# Patient Record
Sex: FEMALE | Race: OTHER | HISPANIC OR LATINO | Employment: UNEMPLOYED | URBAN - METROPOLITAN AREA
[De-identification: names, ages, dates, MRNs, and addresses within clinical notes are randomized per-mention and may not be internally consistent; named-entity substitution may affect disease eponyms.]

---

## 2021-06-08 ENCOUNTER — HOSPITAL ENCOUNTER (EMERGENCY)
Facility: HOSPITAL | Age: 36
Discharge: HOME | End: 2021-06-08
Attending: EMERGENCY MEDICINE | Admitting: EMERGENCY MEDICINE
Payer: COMMERCIAL

## 2021-06-08 ENCOUNTER — APPOINTMENT (EMERGENCY)
Dept: CT IMAGING | Facility: HOSPITAL | Age: 36
End: 2021-06-08
Payer: COMMERCIAL

## 2021-06-08 VITALS
WEIGHT: 209.44 LBS | DIASTOLIC BLOOD PRESSURE: 81 MMHG | SYSTOLIC BLOOD PRESSURE: 124 MMHG | HEART RATE: 88 BPM | OXYGEN SATURATION: 98 % | RESPIRATION RATE: 16 BRPM | TEMPERATURE: 98.8 F

## 2021-06-08 DIAGNOSIS — S30.1XXA ABDOMINAL WALL SEROMA, INITIAL ENCOUNTER: Primary | ICD-10-CM

## 2021-06-08 LAB
ALBUMIN SERPL BCP-MCNC: 4 G/DL (ref 3.5–5)
ALP SERPL-CCNC: 53 U/L (ref 46–116)
ALT SERPL W P-5'-P-CCNC: 51 U/L (ref 12–78)
ANION GAP SERPL CALCULATED.3IONS-SCNC: 11 MMOL/L (ref 4–13)
AST SERPL W P-5'-P-CCNC: 20 U/L (ref 5–45)
BACTERIA UR QL AUTO: ABNORMAL /HPF
BASOPHILS # BLD AUTO: 0.03 THOUSANDS/ΜL (ref 0–0.1)
BASOPHILS NFR BLD AUTO: 0 % (ref 0–1)
BILIRUB SERPL-MCNC: 0.31 MG/DL (ref 0.2–1)
BILIRUB UR QL STRIP: NEGATIVE
BUN SERPL-MCNC: 12 MG/DL (ref 5–25)
CALCIUM SERPL-MCNC: 9.4 MG/DL (ref 8.3–10.1)
CHLORIDE SERPL-SCNC: 106 MMOL/L (ref 100–108)
CLARITY UR: CLEAR
CO2 SERPL-SCNC: 23 MMOL/L (ref 21–32)
COLOR UR: YELLOW
CREAT SERPL-MCNC: 0.58 MG/DL (ref 0.6–1.3)
EOSINOPHIL # BLD AUTO: 0.08 THOUSAND/ΜL (ref 0–0.61)
EOSINOPHIL NFR BLD AUTO: 1 % (ref 0–6)
ERYTHROCYTE [DISTWIDTH] IN BLOOD BY AUTOMATED COUNT: 13.3 % (ref 11.6–15.1)
EXT PREG TEST URINE: NEGATIVE
EXT. CONTROL ED NAV: NORMAL
GFR SERPL CREATININE-BSD FRML MDRD: 121 ML/MIN/1.73SQ M
GLUCOSE SERPL-MCNC: 90 MG/DL (ref 65–140)
GLUCOSE UR STRIP-MCNC: NEGATIVE MG/DL
HCT VFR BLD AUTO: 40.7 % (ref 34.8–46.1)
HGB BLD-MCNC: 13.2 G/DL (ref 11.5–15.4)
HGB UR QL STRIP.AUTO: ABNORMAL
IMM GRANULOCYTES # BLD AUTO: 0.03 THOUSAND/UL (ref 0–0.2)
IMM GRANULOCYTES NFR BLD AUTO: 0 % (ref 0–2)
KETONES UR STRIP-MCNC: NEGATIVE MG/DL
LEUKOCYTE ESTERASE UR QL STRIP: NEGATIVE
LYMPHOCYTES # BLD AUTO: 2.69 THOUSANDS/ΜL (ref 0.6–4.47)
LYMPHOCYTES NFR BLD AUTO: 36 % (ref 14–44)
MCH RBC QN AUTO: 28.1 PG (ref 26.8–34.3)
MCHC RBC AUTO-ENTMCNC: 32.4 G/DL (ref 31.4–37.4)
MCV RBC AUTO: 87 FL (ref 82–98)
MONOCYTES # BLD AUTO: 0.43 THOUSAND/ΜL (ref 0.17–1.22)
MONOCYTES NFR BLD AUTO: 6 % (ref 4–12)
NEUTROPHILS # BLD AUTO: 4.28 THOUSANDS/ΜL (ref 1.85–7.62)
NEUTS SEG NFR BLD AUTO: 57 % (ref 43–75)
NITRITE UR QL STRIP: NEGATIVE
NON-SQ EPI CELLS URNS QL MICRO: ABNORMAL /HPF
NRBC BLD AUTO-RTO: 0 /100 WBCS
PH UR STRIP.AUTO: 6 [PH] (ref 4.5–8)
PLATELET # BLD AUTO: 253 THOUSANDS/UL (ref 149–390)
PMV BLD AUTO: 11.1 FL (ref 8.9–12.7)
POTASSIUM SERPL-SCNC: 3.9 MMOL/L (ref 3.5–5.3)
PROT SERPL-MCNC: 7.8 G/DL (ref 6.4–8.2)
PROT UR STRIP-MCNC: NEGATIVE MG/DL
RBC # BLD AUTO: 4.69 MILLION/UL (ref 3.81–5.12)
RBC #/AREA URNS AUTO: ABNORMAL /HPF
SODIUM SERPL-SCNC: 140 MMOL/L (ref 136–145)
SP GR UR STRIP.AUTO: >=1.03 (ref 1–1.03)
UROBILINOGEN UR QL STRIP.AUTO: 0.2 E.U./DL
WBC # BLD AUTO: 7.54 THOUSAND/UL (ref 4.31–10.16)
WBC #/AREA URNS AUTO: ABNORMAL /HPF

## 2021-06-08 PROCEDURE — 81001 URINALYSIS AUTO W/SCOPE: CPT

## 2021-06-08 PROCEDURE — NC001 PR NO CHARGE: Performed by: SURGERY

## 2021-06-08 PROCEDURE — 99284 EMERGENCY DEPT VISIT MOD MDM: CPT | Performed by: EMERGENCY MEDICINE

## 2021-06-08 PROCEDURE — 81025 URINE PREGNANCY TEST: CPT | Performed by: EMERGENCY MEDICINE

## 2021-06-08 PROCEDURE — 74177 CT ABD & PELVIS W/CONTRAST: CPT

## 2021-06-08 PROCEDURE — 36415 COLL VENOUS BLD VENIPUNCTURE: CPT | Performed by: EMERGENCY MEDICINE

## 2021-06-08 PROCEDURE — 80053 COMPREHEN METABOLIC PANEL: CPT | Performed by: EMERGENCY MEDICINE

## 2021-06-08 PROCEDURE — 85025 COMPLETE CBC W/AUTO DIFF WBC: CPT | Performed by: EMERGENCY MEDICINE

## 2021-06-08 PROCEDURE — 96374 THER/PROPH/DIAG INJ IV PUSH: CPT

## 2021-06-08 PROCEDURE — 99284 EMERGENCY DEPT VISIT MOD MDM: CPT

## 2021-06-08 RX ORDER — NAPROXEN 500 MG/1
500 TABLET ORAL 2 TIMES DAILY PRN
Qty: 14 TABLET | Refills: 0 | Status: SHIPPED | OUTPATIENT
Start: 2021-06-08 | End: 2022-02-09 | Stop reason: ALTCHOICE

## 2021-06-08 RX ORDER — KETOROLAC TROMETHAMINE 30 MG/ML
15 INJECTION, SOLUTION INTRAMUSCULAR; INTRAVENOUS ONCE
Status: COMPLETED | OUTPATIENT
Start: 2021-06-08 | End: 2021-06-08

## 2021-06-08 RX ADMIN — IOHEXOL 100 ML: 350 INJECTION, SOLUTION INTRAVENOUS at 12:04

## 2021-06-08 RX ADMIN — KETOROLAC TROMETHAMINE 15 MG: 30 INJECTION, SOLUTION INTRAMUSCULAR; INTRAVENOUS at 11:04

## 2021-06-08 NOTE — DISCHARGE INSTRUCTIONS
The seroma fluid collection does not appear infected, but it can be uncomfortable  It typically absorbs on its own and it can sometimes drain from the skin  You should try to follow with a local plastic surgeon  I entered a referral   Return to the ED if you are experiencing increasing swelling, redness, large open wound, thick drainage, fever, or severe pain

## 2021-06-08 NOTE — ED PROVIDER NOTES
History  Chief Complaint   Patient presents with    Abdominal Pain     lower abd pain that started three days ago  denies n/v/d      30 yo female with HTN, hypothyroid, c/o abdominal pain she localizes to lower mid abdomen, suprapubic area, described as cramping, without associated fever, chills, nausea, vomiting  She denies urinary symptoms  She reports normal bowel movements  LMP is irregular, so she is not certain  She is S/P elective "tummy tuck" procedure she underwent in [facility] 2 months ago  [place]; she has not experienced any complications  She has been well in the interval until this onset of discomfort, that she is not attributing to the surgery  History provided by:  Patient  Abdominal Pain  Pain location:  Suprapubic  Pain quality: cramping    Pain radiates to:  Does not radiate  Onset quality:  Gradual  Duration:  3 days  Timing:  Intermittent  Progression:  Waxing and waning  Chronicity:  New  Context: previous surgery (S/P "Tummy Tuck" in [facility] two months ago)    Associated symptoms: no chest pain, no chills, no cough, no diarrhea, no dysuria, no fever, no hematuria, no nausea, no shortness of breath, no sore throat and no vomiting        None       Past Medical History:   Diagnosis Date    Disease of thyroid gland     Hypertension        Past Surgical History:   Procedure Laterality Date    COSMETIC SURGERY         History reviewed  No pertinent family history  I have reviewed and agree with the history as documented  E-Cigarette/Vaping    E-Cigarette Use Never User      E-Cigarette/Vaping Substances     Social History     Tobacco Use    Smoking status: Never Smoker    Smokeless tobacco: Never Used   Substance Use Topics    Alcohol use: Never     Frequency: Never    Drug use: Never       Review of Systems   Constitutional: Negative for appetite change, chills and fever  HENT: Negative for sore throat      Respiratory: Negative for cough, shortness of breath and wheezing  Cardiovascular: Negative for chest pain and palpitations  Gastrointestinal: Positive for abdominal pain  Negative for diarrhea, nausea and vomiting  Genitourinary: Negative for dysuria and hematuria  Musculoskeletal: Negative for neck pain  Skin: Negative for rash  Neurological: Negative for dizziness, weakness and headaches  Psychiatric/Behavioral: Negative for suicidal ideas  All other systems reviewed and are negative  Physical Exam  Physical Exam  Vitals signs and nursing note reviewed  Constitutional:       Appearance: She is well-developed  She is not toxic-appearing or diaphoretic  HENT:      Head: Normocephalic and atraumatic  Right Ear: Tympanic membrane and external ear normal       Left Ear: Tympanic membrane and external ear normal       Nose: Nose normal    Eyes:      Conjunctiva/sclera: Conjunctivae normal       Pupils: Pupils are equal, round, and reactive to light  Neck:      Musculoskeletal: Full passive range of motion without pain, normal range of motion and neck supple  Meningeal: Brudzinski's sign and Kernig's sign absent  Cardiovascular:      Rate and Rhythm: Normal rate and regular rhythm  Pulses: Normal pulses  Heart sounds: Normal heart sounds  No murmur  Pulmonary:      Effort: Pulmonary effort is normal  No tachypnea or respiratory distress  Breath sounds: Normal breath sounds  No wheezing  Abdominal:      General: Bowel sounds are normal  There is no distension  Palpations: Abdomen is soft  Abdomen is not rigid  Tenderness: There is abdominal tenderness (corresponds to the area overlying the surgical scar in the middle and also just inferior to it, although no flucturance, nor mass)  There is no guarding or rebound  Musculoskeletal: Normal range of motion  Right lower leg: She exhibits no swelling  Left lower leg: She exhibits no swelling     Lymphadenopathy: Cervical: No cervical adenopathy  Skin:     General: Skin is warm and dry  Coloration: Skin is not pale  Findings: No rash  Neurological:      Mental Status: She is alert and oriented to person, place, and time  GCS: GCS eye subscore is 4  GCS verbal subscore is 5  GCS motor subscore is 6  Cranial Nerves: No cranial nerve deficit  Sensory: No sensory deficit  Coordination: Coordination normal       Gait: Gait normal       Deep Tendon Reflexes: Reflexes are normal and symmetric  Psychiatric:         Speech: Speech normal          Behavior: Behavior normal          Thought Content:  Thought content normal          Judgment: Judgment normal          Vital Signs  ED Triage Vitals [06/08/21 1016]   Temperature Pulse Respirations Blood Pressure SpO2   98 8 °F (37 1 °C) 93 14 150/93 98 %      Temp Source Heart Rate Source Patient Position - Orthostatic VS BP Location FiO2 (%)   Oral Monitor Sitting Right arm --      Pain Score       8           Vitals:    06/08/21 1016 06/08/21 1304   BP: 150/93 124/81   Pulse: 93 88   Patient Position - Orthostatic VS: Sitting Lying         Visual Acuity      ED Medications  Medications   ketorolac (TORADOL) injection 15 mg (15 mg Intravenous Given 6/8/21 1104)   iohexol (OMNIPAQUE) 350 MG/ML injection (SINGLE-DOSE) 100 mL (100 mL Intravenous Given 6/8/21 1204)       Diagnostic Studies  Results Reviewed     Procedure Component Value Units Date/Time    Urine Microscopic [[id number]]  (Abnormal) Collected: 06/08/21 1116    Lab Status: Final result Specimen: Urine, Other Updated: 06/08/21 1202     RBC, UA 0-1 /hpf      WBC, UA 0-1 /hpf      Epithelial Cells Occasional /hpf      Bacteria, UA Occasional /hpf     Comprehensive metabolic panel [[id number]]  (Abnormal) Collected: 06/08/21 1103    Lab Status: Final result Specimen: Blood from Arm, Right Updated: 06/08/21 1127     Sodium 140 mmol/L      Potassium 3 9 mmol/L      Chloride 106 mmol/L      CO2 23 mmol/L      ANION GAP 11 mmol/L      BUN 12 mg/dL      Creatinine 0 58 mg/dL      Glucose 90 mg/dL      Calcium 9 4 mg/dL      AST 20 U/L      ALT 51 U/L      Alkaline Phosphatase 53 U/L      Total Protein 7 8 g/dL      Albumin 4 0 g/dL      Total Bilirubin 0 31 mg/dL      eGFR 121 ml/min/1 73sq m     Narrative:      National Kidney Disease Foundation guidelines for Chronic Kidney Disease (CKD):     Stage 1 with normal or high GFR (GFR > 90 mL/min/1 73 square meters)    Stage 2 Mild CKD (GFR = 60-89 mL/min/1 73 square meters)    Stage 3A Moderate CKD (GFR = 45-59 mL/min/1 73 square meters)    Stage 3B Moderate CKD (GFR = 30-44 mL/min/1 73 square meters)    Stage 4 Severe CKD (GFR = 15-29 mL/min/1 73 square meters)    Stage 5 End Stage CKD (GFR <15 mL/min/1 73 square meters)  Note: GFR calculation is accurate only with a steady state creatinine    Urine Macroscopic, POC [[id number]]  (Abnormal) Collected: 06/08/21 1116    Lab Status: Final result Specimen: Urine Updated: 06/08/21 1119     Color, UA Yellow     Clarity, UA Clear     pH, UA 6 0     Leukocytes, UA Negative     Nitrite, UA Negative     Protein, UA Negative mg/dl      Glucose, UA Negative mg/dl      Ketones, UA Negative mg/dl      Urobilinogen, UA 0 2 E U /dl      Bilirubin, UA Negative     Blood, UA Trace     Specific Gravity, UA >=1 030    Narrative:      CLINITEK RESULT    CBC and differential [[id number]] Collected: 06/08/21 1103    Lab Status: Final result Specimen: Blood from Arm, Right Updated: 06/08/21 1111     WBC 7 54 Thousand/uL      RBC 4 69 Million/uL      Hemoglobin 13 2 g/dL      Hematocrit 40 7 %      MCV 87 fL      MCH 28 1 pg      MCHC 32 4 g/dL      RDW 13 3 %      MPV 11 1 fL      Platelets 900 Thousands/uL      nRBC 0 /100 WBCs      Neutrophils Relative 57 %      Immat GRANS % 0 %      Lymphocytes Relative 36 %      Monocytes Relative 6 %      Eosinophils Relative 1 %      Basophils Relative 0 %      Neutrophils Absolute 4 28 Thousands/µL      Immature Grans Absolute 0 03 Thousand/uL      Lymphocytes Absolute 2 69 Thousands/µL      Monocytes Absolute 0 43 Thousand/µL      Eosinophils Absolute 0 08 Thousand/µL      Basophils Absolute 0 03 Thousands/µL     POCT pregnancy, urine [[id number]]  (Normal) Resulted: 06/08/21 1103    Lab Status: Final result Updated: 06/08/21 1106     EXT PREG TEST UR (Ref: Negative) negative     Control valid                 CT abdomen pelvis with contrast   Final Result by Charles Zhao MD (06/08 1214)      The patient is status post anterior abdominal wall surgery consistent with the given history  Thin sheet of fluid within the subcutaneous fat of the left infraumbilical abdominal wall measuring 0 5 cm thick, a 10 cm wide and 6 cm long  A postoperative    seroma is likely  Infection unable to be excluded  Clinical correlation is advised  Additional follow-up as clinically necessary  The study was marked in [facility] for immediate notification  Workstation performed: [id number]                    Procedures  Procedures         ED Course  ED Course as of Jun 08 1733   Tue Jun 08, 2021   1330 Results reviewed, subincisional fluid, likely source of pain, consult to general surgery entered   CT abdomen pelvis with contrast   1419 Reviewed results with patient at bedside and updated on the plan with surgery resident  No intervention required  Likely seroma, not currently infected  She is instructed to continue post op care, and to consider followup with local plastic surgery, and given return precautions  SBIRT 20yo+      Most Recent Value   SBIRT (22 yo +)   In order to provide better care to our patients, we are screening all of our patients for alcohol and drug use  Would it be okay to ask you these screening questions? Yes Filed at: 06/08/2021 1117   Initial Alcohol Screen: US AUDIT-C    1  How often do you have a drink containing alcohol?   1 Filed at: 06/08/2021 1117   3a  Male UNDER 65: How often do you have five or more drinks on one occasion? 0 Filed at: 06/08/2021 1117   3b  FEMALE Any Age, or MALE 65+: How often do you have 4 or more drinks on one occassion? 0 Filed at: 06/08/2021 1117   Audit-C Score  1 Filed at: 06/08/2021 1117   PJ: How many times in the past year have you    Used an illegal drug or used a prescription medication for non-medical reasons? Never Filed at: 06/08/2021 1117                    MDM    Disposition  Final diagnoses:   Abdominal wall seroma, initial encounter - postoperative     Time reflects when diagnosis was documented in both MDM as applicable and the Disposition within this note     Time User Action Codes Description Comment    6/8/2021  1:59 PM Valente Duverney L Add [S30 1XXA] Abdominal wall seroma, initial encounter     6/8/2021  1:59 PM Michelle Santana Modify [S30 1XXA] Abdominal wall seroma, initial encounter postoperative      ED Disposition     ED Disposition Condition Date/Time Comment    Discharge Good Tue Jun 8, 2021  2:09 PM [facility] discharge to home/self care              Follow-up Information     Follow up With Specialties Details Why Contact Info Additional Information    [facility] Schedule an appointment as soon as possible for a visit  For followup [address]  [address]  [address] [facility] and [address] [place] [address], [place], [place], [id number] [phone]          Discharge Medication List as of 6/8/2021  2:13 PM      START taking these medications    Details   naproxen (NAPROSYN) 500 mg tablet Take 1 tablet (500 mg total) by mouth 2 (two) times a day as needed for mild pain or moderate pain for up to 14 doses, Starting Tue 6/8/2021, Print               PDMP Review     None          ED Provider  Electronically Signed by           Nils Riley Abby Badillo MD  06/08/21 4773

## 2021-06-08 NOTE — CONSULTS
Consultation - General Surgery   Chanel Monroy 31 y o  female MRN: [id number]  Unit/Bed#: ED 21 Encounter: [id number]    Assessment/Plan     Assessment:  Chanel Monroy is a 34yo female 2 months s/p "tummy tuck" surgery in [facility] with abdominal cramping likely 2/2 seroma formation  Plan:  - No acute intervention necessary at this time as patient is without local signs of infection, afebrile, VSS and without leukocytosis  - CT abdomen pelvis reviewed myself showing fluid within subcu fat of left infraumbilical wall  Likely seroma per radiology read  - Continue post-operative care as instructed by her surgeon   - Recommend follow-up outpatient with Plastic Surgery    History of Present Illness   HPI:  Chanel Monroy is a 31 y o  female who presents with 3 days of cramping abdominal pain  She reports having "tummy tuck" surgery 2 months ago in [facility]  Reports healing uneventfully until 3 days ago  She describes the pain as waxing and waning cramps without radiation of pain  Reports normal bowel movements and denies urinary symptoms  Patient denies nausea, vomiting, chest pain, shortness of breath, chills, fever  She also reports that "they told me I may get some fluid and it will go away on its own "    Consults    Review of Systems   Constitutional: Negative  HENT: Negative  Eyes: Negative  Respiratory: Negative  Cardiovascular: Negative  Gastrointestinal: Positive for abdominal pain  Negative for blood in stool, constipation, diarrhea, nausea and vomiting  Genitourinary: Negative  Musculoskeletal: Negative  Skin: Negative  Neurological: Negative          Historical Information   Past Medical History:   Diagnosis Date    Disease of thyroid gland     Hypertension      Past Surgical History:   Procedure Laterality Date    COSMETIC SURGERY       Social History   Social History     Substance and Sexual Activity   Alcohol Use Never    Frequency: Never     Social History Substance and Sexual Activity   Drug Use Never     E-Cigarette/Vaping    E-Cigarette Use Never User      E-Cigarette/Vaping Substances     Social History     Tobacco Use   Smoking Status Never Smoker   Smokeless Tobacco Never Used     Family History: non-contributory    Meds/Allergies   all current active meds have been reviewed  Allergies   Allergen Reactions    Hydrocodone-Acetaminophen Itching       Objective   First Vitals:   Blood Pressure: 150/93 (06/08/21 1016)  Pulse: 93 (06/08/21 1016)  Temperature: 98 8 °F (37 1 °C) (06/08/21 1016)  Temp Source: Oral (06/08/21 1016)  Respirations: 14 (06/08/21 1016)  Weight - Scale: 95 kg (209 lb 7 oz) (06/08/21 1016)  SpO2: 98 % (06/08/21 1016)    Current Vitals:   Blood Pressure: 124/81 (06/08/21 1304)  Pulse: 88 (06/08/21 1304)  Temperature: 98 8 °F (37 1 °C) (06/08/21 1016)  Temp Source: Oral (06/08/21 1016)  Respirations: 16 (06/08/21 1304)  Weight - Scale: 95 kg (209 lb 7 oz) (06/08/21 1016)  SpO2: 98 % (06/08/21 1304)    No intake or output data in the 24 hours ending 06/08/21 1407    Invasive Devices     Peripheral Intravenous Line            Peripheral IV 06/08/21 Right Antecubital less than 1 day                Physical Exam  Constitutional:       General: She is not in acute distress  Appearance: Normal appearance  She is normal weight  She is not ill-appearing  HENT:      Head: Normocephalic and atraumatic  Mouth/Throat:      Mouth: Mucous membranes are dry  Eyes:      Pupils: Pupils are equal, round, and reactive to light  Cardiovascular:      Rate and Rhythm: Normal rate and regular rhythm  Pulses: Normal pulses  Heart sounds: Normal heart sounds  Pulmonary:      Effort: Pulmonary effort is normal       Breath sounds: Normal breath sounds  Abdominal:      General: Abdomen is flat  Bowel sounds are normal       Palpations: Abdomen is soft  Tenderness: There is abdominal tenderness (infraumbilical)  There is no guarding  Musculoskeletal: Normal range of motion  Skin:     General: Skin is warm and dry  Findings: Lesion (healed surgical scar along lower abdomen consistent with HPI) present  Neurological:      General: No focal deficit present  Mental Status: She is alert and oriented to person, place, and time  Psychiatric:         Mood and Affect: Mood normal          Behavior: Behavior normal      Lab Results:   I have personally reviewed pertinent lab results  , CBC:   Lab Results   Component Value Date    WBC 7 54 06/08/2021    HGB 13 2 06/08/2021    HCT 40 7 06/08/2021    MCV 87 06/08/2021     06/08/2021    MCH 28 1 06/08/2021    MCHC 32 4 06/08/2021    RDW 13 3 06/08/2021    MPV 11 1 06/08/2021    NRBC 0 06/08/2021   , CMP:   Lab Results   Component Value Date    SODIUM 140 06/08/2021    K 3 9 06/08/2021     06/08/2021    CO2 23 06/08/2021    BUN 12 06/08/2021    CREATININE 0 58 (L) 06/08/2021    CALCIUM 9 4 06/08/2021    AST 20 06/08/2021    ALT 51 06/08/2021    ALKPHOS 53 06/08/2021    EGFR 121 06/08/2021     Imaging: I have personally reviewed pertinent reports  EKG, Pathology, and Other Studies: I have personally reviewed pertinent reports  Counseling / Coordination of Care  Total floor / unit time spent today 30 minutes  Greater than 50% of total time was spent with the patient and / or family counseling and / or coordination of care  A description of the counseling / coordination of care

## 2021-06-15 ENCOUNTER — TELEPHONE (OUTPATIENT)
Dept: PLASTIC SURGERY | Facility: CLINIC | Age: 36
End: 2021-06-15

## 2021-06-15 NOTE — TELEPHONE ENCOUNTER
Left a message for patient to call back    she was referred to see us from Dr Luisana Sheppard    she had abdominoplasty in [facility] 2 months ago and has abd pain   Please schedule her for next available visit

## 2022-02-09 ENCOUNTER — HOSPITAL ENCOUNTER (EMERGENCY)
Facility: HOSPITAL | Age: 37
Discharge: HOME | End: 2022-02-09
Attending: EMERGENCY MEDICINE | Admitting: EMERGENCY MEDICINE
Payer: MEDICARE

## 2022-02-09 VITALS
OXYGEN SATURATION: 96 % | TEMPERATURE: 97.8 F | HEART RATE: 84 BPM | DIASTOLIC BLOOD PRESSURE: 91 MMHG | SYSTOLIC BLOOD PRESSURE: 151 MMHG | WEIGHT: 215.2 LBS | RESPIRATION RATE: 20 BRPM

## 2022-02-09 DIAGNOSIS — R42 LIGHTHEADEDNESS: Primary | ICD-10-CM

## 2022-02-09 LAB
ANION GAP SERPL CALCULATED.3IONS-SCNC: 12 MMOL/L (ref 5–14)
ATRIAL RATE: 80 BPM
BASOPHILS # BLD AUTO: 0 THOUSANDS/ΜL (ref 0–0.1)
BASOPHILS NFR BLD AUTO: 1 % (ref 0–1)
BILIRUB UR QL STRIP: NEGATIVE
BUN SERPL-MCNC: 11 MG/DL (ref 5–25)
CALCIUM SERPL-MCNC: 9.7 MG/DL (ref 8.4–10.2)
CHLORIDE SERPL-SCNC: 105 MMOL/L (ref 97–108)
CLARITY UR: CLEAR
CO2 SERPL-SCNC: 25 MMOL/L (ref 22–30)
COLOR UR: NORMAL
CREAT SERPL-MCNC: 0.64 MG/DL (ref 0.6–1.2)
EOSINOPHIL # BLD AUTO: 0.1 THOUSAND/ΜL (ref 0–0.4)
EOSINOPHIL NFR BLD AUTO: 2 % (ref 0–6)
ERYTHROCYTE [DISTWIDTH] IN BLOOD BY AUTOMATED COUNT: 13.6 %
EXT PREG TEST URINE: NEGATIVE
EXT. CONTROL ED NAV: NORMAL
GFR SERPL CREATININE-BSD FRML MDRD: 116 ML/MIN/1.73SQ M
GLUCOSE SERPL-MCNC: 91 MG/DL (ref 70–99)
GLUCOSE UR STRIP-MCNC: NEGATIVE MG/DL
HCT VFR BLD AUTO: 41.2 % (ref 36–46)
HGB BLD-MCNC: 14.2 G/DL (ref 12–16)
HGB UR QL STRIP.AUTO: NEGATIVE
KETONES UR STRIP-MCNC: NEGATIVE MG/DL
LEUKOCYTE ESTERASE UR QL STRIP: NEGATIVE
LYMPHOCYTES # BLD AUTO: 3 THOUSANDS/ΜL (ref 0.5–4)
LYMPHOCYTES NFR BLD AUTO: 38 % (ref 25–45)
MCH RBC QN AUTO: 29.4 PG (ref 26–34)
MCHC RBC AUTO-ENTMCNC: 34.5 G/DL (ref 31–36)
MCV RBC AUTO: 85 FL (ref 80–100)
MONOCYTES # BLD AUTO: 0.5 THOUSAND/ΜL (ref 0.2–0.9)
MONOCYTES NFR BLD AUTO: 7 % (ref 1–10)
NEUTROPHILS # BLD AUTO: 4.1 THOUSANDS/ΜL (ref 1.8–7.8)
NEUTS SEG NFR BLD AUTO: 53 % (ref 45–65)
NITRITE UR QL STRIP: NEGATIVE
P AXIS: 8 DEGREES
PH UR STRIP.AUTO: 6 [PH]
PLATELET # BLD AUTO: 280 THOUSANDS/UL (ref 150–450)
PMV BLD AUTO: 8.7 FL (ref 8.9–12.7)
POTASSIUM SERPL-SCNC: 4 MMOL/L (ref 3.6–5)
PR INTERVAL: 154 MS
PROT UR STRIP-MCNC: NEGATIVE MG/DL
QRS AXIS: 68 DEGREES
QRSD INTERVAL: 90 MS
QT INTERVAL: 390 MS
QTC INTERVAL: 449 MS
RBC # BLD AUTO: 4.83 MILLION/UL (ref 4–5.2)
SODIUM SERPL-SCNC: 142 MMOL/L (ref 137–147)
SP GR UR STRIP.AUTO: 1.01 (ref 1–1.04)
T WAVE AXIS: 29 DEGREES
UROBILINOGEN UA: NEGATIVE MG/DL
VENTRICULAR RATE: 80 BPM
WBC # BLD AUTO: 7.8 THOUSAND/UL (ref 4.5–11)

## 2022-02-09 PROCEDURE — 80048 BASIC METABOLIC PNL TOTAL CA: CPT

## 2022-02-09 PROCEDURE — 93010 ELECTROCARDIOGRAM REPORT: CPT | Performed by: INTERNAL MEDICINE

## 2022-02-09 PROCEDURE — 36415 COLL VENOUS BLD VENIPUNCTURE: CPT

## 2022-02-09 PROCEDURE — 85025 COMPLETE CBC W/AUTO DIFF WBC: CPT

## 2022-02-09 PROCEDURE — 93005 ELECTROCARDIOGRAM TRACING: CPT

## 2022-02-09 PROCEDURE — 81025 URINE PREGNANCY TEST: CPT

## 2022-02-09 PROCEDURE — 96360 HYDRATION IV INFUSION INIT: CPT

## 2022-02-09 PROCEDURE — 99284 EMERGENCY DEPT VISIT MOD MDM: CPT

## 2022-02-09 PROCEDURE — 99285 EMERGENCY DEPT VISIT HI MDM: CPT

## 2022-02-09 RX ORDER — LEVOTHYROXINE SODIUM 0.05 MG/1
50 TABLET ORAL DAILY
COMMUNITY

## 2022-02-09 RX ORDER — LABETALOL 200 MG/1
200 TABLET, FILM COATED ORAL EVERY MORNING
COMMUNITY

## 2022-02-09 RX ADMIN — SODIUM CHLORIDE 1000 ML: 0.9 INJECTION, SOLUTION INTRAVENOUS at 19:57

## 2022-02-09 NOTE — Clinical Note
Tam Kearney was seen and treated in our emergency department on 2/9/2022  Diagnosis:     Marie Wu  is off the rest of the shift today  She may return on this date: If you have any questions or concerns, please don't hesitate to call        Charmayne Honey, PA-C    ______________________________           _______________          _______________  Hospital Representative                              Date                                Time

## 2022-02-10 NOTE — DISCHARGE INSTRUCTIONS
Stay hydrated  Follow up with your Primary Care Provider  Return to ED for new or worsening symptoms as discussed

## 2022-02-10 NOTE — ED PROVIDER NOTES
History  Chief Complaint   Patient presents with    Dizziness     states taking medication to have monthly cycle; states started yesterday- was heavy yesterday but ok today; but noted dizzy and light headaded feeling today when changing position; left ear was clogged, hearing decreased  31 y o  F with PMH of HTN  Hypothyroidism presents to ED c/o 2 episodes of lightheadedness upon standing up  She also reports feeling like her L ear was clogged which resolved with drops  Took medroxyprogesterone for 2 weeks, started menstruating yesterday, reports it is a normal period, not heavy, no clots  Reports she took her labetalol as normal this morning  Reports surgical history of a "tummy tuck: more than 6 months ago  No recent surgeries  No recent immobilization  No PMH or FHx of blood clots  Does not take hormonal birth control  History provided by:  Patient   used: No    Dizziness  Quality:  Lightheadedness  Severity:  Mild  Onset quality:  Sudden  Duration: reports 2 episodes since this morning   Progression:  Resolved  Chronicity:  New  Context: standing up    Relieved by: time, sitting down  Worsened by:  Standing up  Ineffective treatments:  None tried  Associated symptoms: no blood in stool, no chest pain, no diarrhea, no headaches, no hearing loss, no nausea, no palpitations, no shortness of breath, no syncope, no tinnitus, no vision changes, no vomiting and no weakness    Associated symptoms comment:  Denies diaphoresis, fevers, urinary symptoms, leg swelling, facial swelling  Risk factors: new medications    Risk factors: no anemia, no heart disease, no hx of stroke, no hx of vertigo, no Meniere's disease and no multiple medications        Prior to Admission Medications   Prescriptions Last Dose Informant Patient Reported?  Taking?   labetalol (NORMODYNE) 200 mg tablet 2/9/2022 at Unknown time Self Yes Yes   Sig: Take 200 mg by mouth every morning   levothyroxine 50 mcg tablet 2/9/2022 at Unknown time Self Yes Yes   Sig: Take 50 mcg by mouth daily      Facility-Administered Medications: None       Past Medical History:   Diagnosis Date    Disease of thyroid gland     Hypertension        Past Surgical History:   Procedure Laterality Date    COSMETIC SURGERY         History reviewed  No pertinent family history  I have reviewed and agree with the history as documented  E-Cigarette/Vaping    E-Cigarette Use Never User      E-Cigarette/Vaping Substances     Social History     Tobacco Use    Smoking status: Never Smoker    Smokeless tobacco: Never Used   Vaping Use    Vaping Use: Never used   Substance Use Topics    Alcohol use: Never    Drug use: Never       Review of Systems   Constitutional: Negative for activity change, appetite change, chills, diaphoresis, fatigue and fever  HENT: Negative for ear discharge, ear pain, facial swelling, hearing loss, sore throat and tinnitus  Eyes: Negative for pain and visual disturbance  Respiratory: Negative for cough and shortness of breath  Cardiovascular: Negative for chest pain, palpitations, leg swelling and syncope  Gastrointestinal: Negative for abdominal pain, blood in stool, diarrhea, nausea and vomiting  Genitourinary: Positive for vaginal bleeding  Negative for dysuria, flank pain, frequency, hematuria, pelvic pain, urgency, vaginal discharge and vaginal pain  Musculoskeletal: Negative for arthralgias, back pain, gait problem and joint swelling  Skin: Negative for color change and rash  Neurological: Positive for light-headedness  Negative for seizures, syncope, weakness, numbness and headaches  Psychiatric/Behavioral: Negative for confusion  All other systems reviewed and are negative  Physical Exam  Physical Exam  Vitals and nursing note reviewed  Constitutional:       General: She is awake  She is not in acute distress  Appearance: Normal appearance   She is well-developed and well-groomed  She is not ill-appearing, toxic-appearing or diaphoretic  HENT:      Head: Normocephalic and atraumatic  Jaw: No swelling  Right Ear: Tympanic membrane, ear canal and external ear normal       Left Ear: Tympanic membrane, ear canal and external ear normal       Mouth/Throat:      Lips: Pink  Mouth: Mucous membranes are moist       Pharynx: Oropharynx is clear  Uvula midline  No posterior oropharyngeal erythema  Eyes:      General: Lids are normal  Vision grossly intact  Right eye: No discharge  Left eye: No discharge  Extraocular Movements: Extraocular movements intact  Conjunctiva/sclera: Conjunctivae normal       Pupils: Pupils are equal, round, and reactive to light  Cardiovascular:      Rate and Rhythm: Normal rate and regular rhythm  Heart sounds: Normal heart sounds  No murmur heard  Pulmonary:      Effort: Pulmonary effort is normal  No respiratory distress  Breath sounds: Normal breath sounds  Abdominal:      General: There is no distension  Palpations: Abdomen is soft  Tenderness: There is no abdominal tenderness  Musculoskeletal:         General: No swelling  Cervical back: Neck supple  Right lower leg: No edema  Left lower leg: No edema  Skin:     General: Skin is warm and dry  Capillary Refill: Capillary refill takes 2 to 3 seconds  Coloration: Skin is not jaundiced or pale  Findings: No erythema or rash  Neurological:      Mental Status: She is alert  Gait: Gait normal       Comments: GCS 15  AAOx4  No focal neuro deficits  CN II-XII intact  PERRL  EOMI  No pronator drift   strength 5/5 bilaterally  B/L UE strength 5/5 throughout  Finger to nose, heel shin, rapid alternating movements Cerebellar function normal  Ambulates without difficulty  B/L LE strength 5/5 throughout   Gross sensation to b/l upper and lower extremities intact        Psychiatric:         Mood and Affect: Mood normal          Behavior: Behavior normal  Behavior is cooperative  Thought Content:  Thought content normal          Vital Signs  ED Triage Vitals [02/09/22 1837]   Temperature Pulse Respirations Blood Pressure SpO2   97 8 °F (36 6 °C) 95 16 (!) 199/106 100 %      Temp Source Heart Rate Source Patient Position - Orthostatic VS BP Location FiO2 (%)   Oral Monitor Sitting Left arm --      Pain Score       6           Vitals:    02/09/22 1926 02/09/22 1927 02/09/22 2015 02/09/22 2115   BP: (!) 151/103 158/97 144/96 151/91   Pulse: 88 94 82 84   Patient Position - Orthostatic VS: Lying Standing - Orthostatic VS           Visual Acuity      ED Medications  Medications   sodium chloride 0 9 % bolus 1,000 mL (0 mL Intravenous Stopped 2/9/22 2122)       Diagnostic Studies  Results Reviewed     Procedure Component Value Units Date/Time    Basic metabolic panel [[id number]] Collected: 02/09/22 2002    Lab Status: Final result Specimen: Blood from Arm, Left Updated: 02/09/22 2036     Sodium 142 mmol/L      Potassium 4 0 mmol/L      Chloride 105 mmol/L      CO2 25 mmol/L      ANION GAP 12 mmol/L      BUN 11 mg/dL      Creatinine 0 64 mg/dL      Glucose 91 mg/dL      Calcium 9 7 mg/dL      eGFR 116 ml/min/1 73sq m     Narrative:      [place] guidelines for Chronic Kidney Disease (CKD):     Stage 1 with normal or high GFR (GFR > 90 mL/min/1 73 square meters)    Stage 2 Mild CKD (GFR = 60-89 mL/min/1 73 square meters)    Stage 3A Moderate CKD (GFR = 45-59 mL/min/1 73 square meters)    Stage 3B Moderate CKD (GFR = 30-44 mL/min/1 73 square meters)    Stage 4 Severe CKD (GFR = 15-29 mL/min/1 73 square meters)    Stage 5 End Stage CKD (GFR <15 mL/min/1 73 square meters)  Note: GFR calculation is accurate only with a steady state creatinine    UA (URINE) with reflex to Scope [[id number]]  (Normal) Collected: 02/09/22 2002    Lab Status: Final result Specimen: Urine, Other Updated: 02/09/22 2024     Color, UA Straw     Clarity, UA Clear     Specific Gravity, UA 1 015     pH, UA 6 0     Leukocytes, UA Negative     Nitrite, UA Negative     Protein, UA Negative mg/dl      Glucose, UA Negative mg/dl      Ketones, UA Negative mg/dl      Bilirubin, UA Negative     Blood, UA Negative     UROBILINOGEN UA Negative mg/dL     CBC and differential [[id number]]  (Abnormal) Collected: 02/09/22 2002    Lab Status: Final result Specimen: Blood from Arm, Left Updated: 02/09/22 2024     WBC 7 80 Thousand/uL      RBC 4 83 Million/uL      Hemoglobin 14 2 g/dL      Hematocrit 41 2 %      MCV 85 fL      MCH 29 4 pg      MCHC 34 5 g/dL      RDW 13 6 %      MPV 8 7 fL      Platelets 613 Thousands/uL      Neutrophils Relative 53 %      Lymphocytes Relative 38 %      Monocytes Relative 7 %      Eosinophils Relative 2 %      Basophils Relative 1 %      Neutrophils Absolute 4 10 Thousands/µL      Lymphocytes Absolute 3 00 Thousands/µL      Monocytes Absolute 0 50 Thousand/µL      Eosinophils Absolute 0 10 Thousand/µL      Basophils Absolute 0 00 Thousands/µL     POCT pregnancy, urine [[id number]]  (Normal) Resulted: 02/09/22 2014    Lab Status: Final result Updated: 02/09/22 2015     EXT PREG TEST UR (Ref: Negative) negative     Control valid                 No orders to display              Procedures  Procedures         ED Course  ED Course as of 02/10/22 1951   Wed Feb 09, 2022   1924 Procedure Note: EKG  Date/Time: 02/09/22 7:24 PM   Performed by: Debbi Mujica   Authorized by: Debbi Mujica  ECG interpreted by me, the ED Provider: yes   The EKG demonstrates:  Rate 80 bpm  Rhythm NSR  QTc 449 ms  No ST elevations/depressions     2033 PREGNANCY TEST URINE: negative   2033 Hemoglobin: 14 2                               SBIRT 20yo+      Most Recent Value   SBIRT (23 yo +)    In order to provide better care to our patients, we are screening all of our patients for alcohol and drug use   Would it be okay to ask you these screening questions? Yes Filed at: 02/09/2022 2011   Initial Alcohol Screen: US AUDIT-C     1  How often do you have a drink containing alcohol? 1 Filed at: 02/09/2022 2011   2  How many drinks containing alcohol do you have on a typical day you are drinking? 1 Filed at: 02/09/2022 2011   3b  FEMALE Any Age, or MALE 65+: How often do you have 4 or more drinks on one occassion? 0 Filed at: 02/09/2022 2011   Audit-C Score 2 Filed at: 02/09/2022 2011   PJ: How many times in the past year have you    Used an illegal drug or used a prescription medication for non-medical reasons? Never Filed at: 02/09/2022 2011                    MDM  Number of Diagnoses or Management Options  Lightheadedness  Diagnosis management comments: PMH of HTN  2 instances of lightheadedness with standing today  No HA, CP, SOB, palpitations, N/V, back pain, abdominal pain, syncope, vision changes  No hypo or hyperthermia  HR WNL  No hypotension  delayed Capillary refill 2-3 seconds  No focal neuro deficits  No other acute findings on physical exam  No CP or SOB, PERC negative  Not pregnant  Hbg, electrolytes, kidney function WNL  No hypoglycemia  EKG without acute findings  UA without acute findings  No signs of end organ damage  Symptoms and findings not consistent with myxedema coma or thyrotoxicosis  IVF given for mild dehydration  Patient did not experience any episodes while here  Discussed with attending, patient is stable for discharge, PCP follow up  All imaging and/or lab testing discussed with patient, strict return to ED precautions discussed  Patient recommended to follow up promptly with appropriate outpatient provider  Patient and/or family members verbalizes understanding and agrees with plan  Patient and/or family members were given opportunity to ask questions, all questions were answered at this time  Patient is stable for discharge      Portions of the record may have been created with voice recognition software  Occasional wrong word or "sound a like" substitutions may have occurred due to the inherent limitations of voice recognition software  Read the chart carefully and recognize, using context, where substitutions have occurred  Amount and/or Complexity of Data Reviewed  Clinical lab tests: ordered and reviewed  Discuss the patient with other providers: yes (Dr Valerie Auguste)        Disposition  Final diagnoses:   Lightheadedness     Time reflects when diagnosis was documented in both MDM as applicable and the Disposition within this note     Time User Action Codes Description Comment    2/9/2022  8:41 PM Yumiko Cordova Add [[id number]] [facility]       ED Disposition     ED Disposition Condition Date/Time Comment    Discharge Stable Wed Feb 9, 2022  8:42 PM Prakash Mendoza discharge to home/self care  Follow-up Information     Follow up With Specialties Details Why Contact Tanmay Gan MD [facility] Schedule an appointment as soon as possible for a visit  For follow up regarding your symptoms [id number]   [phone]            Discharge Medication List as of 2/9/2022  8:45 PM      CONTINUE these medications which have NOT CHANGED    Details   labetalol (NORMODYNE) 200 mg tablet Take 200 mg by mouth every morning, Historical Med      levothyroxine 50 mcg tablet Take 50 mcg by mouth daily, Historical Med             No discharge procedures on file      PDMP Review     None          ED Provider  Electronically Signed by           Lawrence Munoz PA-C  02/10/22 1951

## 2022-02-11 NOTE — ED ATTENDING ATTESTATION
I was the attending physician on duty at the time the patient visited the emergency department  The patient was evaluated and dispositioned by the APC  I was personally available for consultation  I am administratively signing the chart after the fact      Erika Kumar MD

## 2022-02-25 ENCOUNTER — HOSPITAL ENCOUNTER (EMERGENCY)
Facility: HOSPITAL | Age: 37
Discharge: HOME | End: 2022-02-25
Attending: EMERGENCY MEDICINE
Payer: MEDICARE

## 2022-02-25 VITALS
DIASTOLIC BLOOD PRESSURE: 80 MMHG | WEIGHT: 218.26 LBS | RESPIRATION RATE: 18 BRPM | SYSTOLIC BLOOD PRESSURE: 137 MMHG | OXYGEN SATURATION: 98 % | HEART RATE: 87 BPM | TEMPERATURE: 98 F

## 2022-02-25 DIAGNOSIS — J02.9 PHARYNGITIS: Primary | ICD-10-CM

## 2022-02-25 DIAGNOSIS — Z20.822 ENCOUNTER FOR LABORATORY TESTING FOR COVID-19 VIRUS: ICD-10-CM

## 2022-02-25 LAB — S PYO DNA THROAT QL NAA+PROBE: NOT DETECTED

## 2022-02-25 PROCEDURE — 99283 EMERGENCY DEPT VISIT LOW MDM: CPT

## 2022-02-25 PROCEDURE — 99284 EMERGENCY DEPT VISIT MOD MDM: CPT | Performed by: PHYSICIAN ASSISTANT

## 2022-02-25 PROCEDURE — U0003 INFECTIOUS AGENT DETECTION BY NUCLEIC ACID (DNA OR RNA); SEVERE ACUTE RESPIRATORY SYNDROME CORONAVIRUS 2 (SARS-COV-2) (CORONAVIRUS DISEASE [COVID-19]), AMPLIFIED PROBE TECHNIQUE, MAKING USE OF HIGH THROUGHPUT TECHNOLOGIES AS DESCRIBED BY CMS-2020-01-R: HCPCS | Performed by: PHYSICIAN ASSISTANT

## 2022-02-25 PROCEDURE — U0005 INFEC AGEN DETEC AMPLI PROBE: HCPCS | Performed by: PHYSICIAN ASSISTANT

## 2022-02-25 PROCEDURE — 87651 STREP A DNA AMP PROBE: CPT | Performed by: PHYSICIAN ASSISTANT

## 2022-02-25 RX ORDER — AMOXICILLIN 500 MG/1
500 CAPSULE ORAL EVERY 8 HOURS SCHEDULED
Qty: 30 CAPSULE | Refills: 0 | Status: SHIPPED | OUTPATIENT
Start: 2022-02-25 | End: 2022-03-07

## 2022-02-25 NOTE — DISCHARGE INSTRUCTIONS

## 2022-02-25 NOTE — ED PROVIDER NOTES
History  Chief Complaint   Patient presents with    Sore Throat     pt arrives c/o sore throat for a couple days      Pt with sore throat for several days       Sore Throat  Location:  Generalized  Quality:  Aching  Severity:  Mild  Onset quality:  Gradual  Duration:  3 days  Timing:  Constant  Progression:  Unchanged  Chronicity:  New  Worsened by:  Swallowing  Ineffective treatments:  None tried  Associated symptoms: no abdominal pain    Risk factors: no exposure to strep        Prior to Admission Medications   Prescriptions Last Dose Informant Patient Reported? Taking?   labetalol (NORMODYNE) 200 mg tablet  Self Yes No   Sig: Take 200 mg by mouth every morning   levothyroxine 50 mcg tablet  Self Yes No   Sig: Take 50 mcg by mouth daily      Facility-Administered Medications: None       Past Medical History:   Diagnosis Date    Disease of thyroid gland     Hypertension        Past Surgical History:   Procedure Laterality Date    COSMETIC SURGERY         History reviewed  No pertinent family history  I have reviewed and agree with the history as documented  E-Cigarette/Vaping    E-Cigarette Use Never User      E-Cigarette/Vaping Substances     Social History     Tobacco Use    Smoking status: Never Smoker    Smokeless tobacco: Never Used   Vaping Use    Vaping Use: Never used   Substance Use Topics    Alcohol use: Never    Drug use: Never       Review of Systems   Constitutional: Negative  HENT: Positive for sore throat  Eyes: Negative  Respiratory: Negative  Cardiovascular: Negative  Gastrointestinal: Negative  Negative for abdominal pain  Endocrine: Negative  Genitourinary: Negative  Musculoskeletal: Negative  Skin: Negative  Allergic/Immunologic: Negative  Neurological: Negative  Hematological: Negative  Psychiatric/Behavioral: Negative  All other systems reviewed and are negative  Physical Exam  Physical Exam  Vitals and nursing note reviewed  Constitutional:       Appearance: She is well-developed  HENT:      Head: Normocephalic and atraumatic  Right Ear: Tympanic membrane and ear canal normal       Left Ear: Tympanic membrane and ear canal normal       Nose: Congestion and rhinorrhea present  Mouth/Throat:      Mouth: Mucous membranes are moist       Pharynx: Oropharynx is clear  Posterior oropharyngeal erythema present  Tonsils: No tonsillar exudate or tonsillar abscesses  Eyes:      Conjunctiva/sclera: Conjunctivae normal       Pupils: Pupils are equal, round, and reactive to light  Cardiovascular:      Rate and Rhythm: Normal rate and regular rhythm  Heart sounds: Normal heart sounds  Pulmonary:      Effort: Pulmonary effort is normal       Breath sounds: Normal breath sounds  Abdominal:      General: Bowel sounds are normal       Palpations: Abdomen is soft  Musculoskeletal:      Cervical back: Normal range of motion and neck supple  Lymphadenopathy:      Cervical: Cervical adenopathy present  Skin:     General: Skin is warm  Capillary Refill: Capillary refill takes less than 2 seconds  Neurological:      General: No focal deficit present  Mental Status: She is alert and oriented to person, place, and time     Psychiatric:         Mood and Affect: Mood normal          Behavior: Behavior normal          Vital Signs  ED Triage Vitals [02/25/22 1204]   Temperature Pulse Respirations Blood Pressure SpO2   98 °F (36 7 °C) 87 18 137/80 98 %      Temp Source Heart Rate Source Patient Position - Orthostatic VS BP Location FiO2 (%)   Tympanic Monitor Sitting Left arm --      Pain Score       --           Vitals:    02/25/22 1204   BP: 137/80   Pulse: 87   Patient Position - Orthostatic VS: Sitting         Visual Acuity      ED Medications  Medications - No data to display    Diagnostic Studies  Results Reviewed     Procedure Component Value Units Date/Time    Strep A PCR [[id number]]  (Normal) Collected: 02/25/22 1225    Lab Status: Final result Specimen: Throat Updated: 02/25/22 1313     STREP A PCR Not Detected    COVID only [[id number]] Collected: 02/25/22 1225    Lab Status: In process Specimen: Nares from Nose Updated: 02/25/22 1238                 No orders to display              Procedures  Procedures         ED Course                               SBIRT 22yo+      Most Recent Value   SBIRT (23 yo +)    In order to provide better care to our patients, we are screening all of our patients for alcohol and drug use  Would it be okay to ask you these screening questions? Yes Filed at: 02/25/2022 1228   Initial Alcohol Screen: US AUDIT-C     1  How often do you have a drink containing alcohol? 1 Filed at: 02/25/2022 1228   2  How many drinks containing alcohol do you have on a typical day you are drinking? 0 Filed at: 02/25/2022 1228   3b  FEMALE Any Age, or MALE 65+: How often do you have 4 or more drinks on one occassion? 0 Filed at: 02/25/2022 1228   Audit-C Score 1 Filed at: 02/25/2022 1228   PJ: How many times in the past year have you    Used an illegal drug or used a prescription medication for non-medical reasons? Never Filed at: 02/25/2022 1228                    MDM    Disposition  Final diagnoses:   Pharyngitis   Encounter for laboratory testing for COVID-19 virus     Time reflects when diagnosis was documented in both MDM as applicable and the Disposition within this note     Time User Action Codes Description Comment    2/25/2022 12:28 PM Parker Yung  Add [J02 9] Pharyngitis     2/25/2022 12:28 PM Parker Yung  Add [Z20 [id number]] Encounter for laboratory testing for COVID-19 virus       ED Disposition     ED Disposition Condition Date/Time Comment    Discharge Stable Fri Feb 25, 2022 12:28 PM Rafita Martinez discharge to home/self care              Follow-up Information     Follow up With Specialties Details Why Lawyer Berto MD Family Medicine   [place] [id number] [address]  [phone]            Discharge Medication List as of 2/25/2022 12:30 PM      START taking these medications    Details   amoxicillin (AMOXIL) 500 mg capsule Take 1 capsule (500 mg total) by mouth every 8 (eight) hours for 10 days, Starting Fri 2/25/2022, Until Mon 3/7/2022, Print         CONTINUE these medications which have NOT CHANGED    Details   labetalol (NORMODYNE) 200 mg tablet Take 200 mg by mouth every morning, Historical Med      levothyroxine 50 mcg tablet Take 50 mcg by mouth daily, Historical Med             No discharge procedures on file      PDMP Review     None          ED Provider  Electronically Signed by           Celina Hummel PA-C  02/25/22 1272

## 2022-02-26 LAB — SARS-COV-2 RNA RESP QL NAA+PROBE: NEGATIVE

## 2022-02-26 NOTE — RESULT ENCOUNTER NOTE
Spoke with patient, informed of negative results  Also informed of negative Strep A results after patient asked  She was encouraged to follow up with her PCP  Patient was given chance to ask questions and voiced understanding of topics discussed

## 2022-02-28 ENCOUNTER — APPOINTMENT (OUTPATIENT)
Dept: LAB | Facility: HOSPITAL | Age: 37
End: 2022-02-28
Payer: MEDICARE

## 2022-02-28 DIAGNOSIS — Z11.59 NEED FOR HEPATITIS C SCREENING TEST: ICD-10-CM

## 2022-02-28 DIAGNOSIS — E03.9 ACQUIRED HYPOTHYROIDISM: ICD-10-CM

## 2022-02-28 DIAGNOSIS — Z13.6 SCREENING FOR CARDIOVASCULAR CONDITION: ICD-10-CM

## 2022-02-28 LAB
CHOLEST SERPL-MCNC: 172 MG/DL
HCV AB SER QL: NORMAL
HDLC SERPL-MCNC: 37 MG/DL
LDLC SERPL CALC-MCNC: 99 MG/DL (ref 0–100)
NONHDLC SERPL-MCNC: 135 MG/DL
TRIGL SERPL-MCNC: 178 MG/DL
TSH SERPL DL<=0.05 MIU/L-ACNC: 1.11 UIU/ML (ref 0.36–3.74)

## 2022-02-28 PROCEDURE — 36415 COLL VENOUS BLD VENIPUNCTURE: CPT

## 2022-02-28 PROCEDURE — 80061 LIPID PANEL: CPT

## 2022-02-28 PROCEDURE — 84443 ASSAY THYROID STIM HORMONE: CPT

## 2022-02-28 PROCEDURE — 86803 HEPATITIS C AB TEST: CPT

## 2022-11-01 ENCOUNTER — HOSPITAL ENCOUNTER (EMERGENCY)
Facility: HOSPITAL | Age: 37
Discharge: HOME | End: 2022-11-01
Attending: EMERGENCY MEDICINE

## 2022-11-01 VITALS
RESPIRATION RATE: 18 BRPM | OXYGEN SATURATION: 99 % | DIASTOLIC BLOOD PRESSURE: 88 MMHG | TEMPERATURE: 99.6 F | WEIGHT: 222.5 LBS | SYSTOLIC BLOOD PRESSURE: 141 MMHG | HEART RATE: 111 BPM

## 2022-11-01 DIAGNOSIS — J06.9 VIRAL URI WITH COUGH: Primary | ICD-10-CM

## 2022-11-01 RX ORDER — PSEUDOEPHEDRINE HCL 30 MG
60 TABLET ORAL ONCE
Status: COMPLETED | OUTPATIENT
Start: 2022-11-01 | End: 2022-11-01

## 2022-11-01 RX ORDER — NAPROXEN 500 MG/1
500 TABLET ORAL 2 TIMES DAILY WITH MEALS
Qty: 30 TABLET | Refills: 0 | Status: SHIPPED | OUTPATIENT
Start: 2022-11-01

## 2022-11-01 RX ORDER — FLUTICASONE PROPIONATE 50 MCG
1 SPRAY, SUSPENSION (ML) NASAL DAILY
Qty: 16 G | Refills: 0 | Status: SHIPPED | OUTPATIENT
Start: 2022-11-01

## 2022-11-01 RX ORDER — NAPROXEN 500 MG/1
500 TABLET ORAL ONCE
Status: COMPLETED | OUTPATIENT
Start: 2022-11-01 | End: 2022-11-01

## 2022-11-01 RX ADMIN — PSEUDOEPHEDRINE HCL 60 MG: 30 TABLET, FILM COATED ORAL at 21:46

## 2022-11-01 RX ADMIN — NAPROXEN 500 MG: 500 TABLET ORAL at 21:46

## 2022-11-02 LAB
FLUAV RNA RESP QL NAA+PROBE: NEGATIVE
FLUBV RNA RESP QL NAA+PROBE: NEGATIVE
SARS-COV-2 RNA RESP QL NAA+PROBE: POSITIVE

## 2023-03-28 ENCOUNTER — HOSPITAL ENCOUNTER (EMERGENCY)
Facility: HOSPITAL | Age: 38
Discharge: HOME | End: 2023-03-28
Attending: EMERGENCY MEDICINE

## 2023-03-28 VITALS
OXYGEN SATURATION: 97 % | WEIGHT: 213.85 LBS | DIASTOLIC BLOOD PRESSURE: 67 MMHG | TEMPERATURE: 98.5 F | SYSTOLIC BLOOD PRESSURE: 116 MMHG | RESPIRATION RATE: 16 BRPM | HEART RATE: 85 BPM

## 2023-03-28 DIAGNOSIS — R42 VERTIGO: Primary | ICD-10-CM

## 2023-03-28 LAB
ANION GAP SERPL CALCULATED.3IONS-SCNC: 7 MMOL/L (ref 4–13)
ATRIAL RATE: 77 BPM
BASOPHILS # BLD AUTO: 0.03 THOUSANDS/ÂΜL (ref 0–0.1)
BASOPHILS NFR BLD AUTO: 0 % (ref 0–1)
BUN SERPL-MCNC: 8 MG/DL (ref 5–25)
CALCIUM SERPL-MCNC: 9.7 MG/DL (ref 8.4–10.2)
CHLORIDE SERPL-SCNC: 108 MMOL/L (ref 96–108)
CO2 SERPL-SCNC: 24 MMOL/L (ref 21–32)
CREAT SERPL-MCNC: 0.59 MG/DL (ref 0.6–1.3)
EOSINOPHIL # BLD AUTO: 0.06 THOUSAND/ÂΜL (ref 0–0.61)
EOSINOPHIL NFR BLD AUTO: 1 % (ref 0–6)
ERYTHROCYTE [DISTWIDTH] IN BLOOD BY AUTOMATED COUNT: 12.9 % (ref 11.6–15.1)
GFR SERPL CREATININE-BSD FRML MDRD: 118 ML/MIN/1.73SQ M
GLUCOSE SERPL-MCNC: 103 MG/DL (ref 65–140)
HCT VFR BLD AUTO: 40.4 % (ref 34.8–46.1)
HGB BLD-MCNC: 13.8 G/DL (ref 11.5–15.4)
IMM GRANULOCYTES # BLD AUTO: 0.03 THOUSAND/UL (ref 0–0.2)
IMM GRANULOCYTES NFR BLD AUTO: 0 % (ref 0–2)
LYMPHOCYTES # BLD AUTO: 2.25 THOUSANDS/ÂΜL (ref 0.6–4.47)
LYMPHOCYTES NFR BLD AUTO: 32 % (ref 14–44)
MCH RBC QN AUTO: 29.3 PG (ref 26.8–34.3)
MCHC RBC AUTO-ENTMCNC: 34.2 G/DL (ref 31.4–37.4)
MCV RBC AUTO: 86 FL (ref 82–98)
MONOCYTES # BLD AUTO: 0.3 THOUSAND/ÂΜL (ref 0.17–1.22)
MONOCYTES NFR BLD AUTO: 4 % (ref 4–12)
NEUTROPHILS # BLD AUTO: 4.34 THOUSANDS/ÂΜL (ref 1.85–7.62)
NEUTS SEG NFR BLD AUTO: 63 % (ref 43–75)
NRBC BLD AUTO-RTO: 0 /100 WBCS
P AXIS: 11 DEGREES
PLATELET # BLD AUTO: 255 THOUSANDS/UL (ref 149–390)
PMV BLD AUTO: 10.2 FL (ref 8.9–12.7)
POTASSIUM SERPL-SCNC: 3.6 MMOL/L (ref 3.5–5.3)
PR INTERVAL: 134 MS
QRS AXIS: 83 DEGREES
QRSD INTERVAL: 88 MS
QT INTERVAL: 370 MS
QTC INTERVAL: 418 MS
RBC # BLD AUTO: 4.71 MILLION/UL (ref 3.81–5.12)
SODIUM SERPL-SCNC: 139 MMOL/L (ref 135–147)
T WAVE AXIS: 54 DEGREES
VENTRICULAR RATE: 77 BPM
WBC # BLD AUTO: 7.01 THOUSAND/UL (ref 4.31–10.16)

## 2023-03-28 RX ORDER — MECLIZINE HYDROCHLORIDE 25 MG/1
25 TABLET ORAL 3 TIMES DAILY PRN
Qty: 30 TABLET | Refills: 0 | Status: SHIPPED | OUTPATIENT
Start: 2023-03-28

## 2023-03-28 RX ORDER — DIAZEPAM 5 MG/1
5 TABLET ORAL ONCE
Status: COMPLETED | OUTPATIENT
Start: 2023-03-28 | End: 2023-03-28

## 2023-03-28 RX ORDER — DIAZEPAM 5 MG/ML
5 INJECTION, SOLUTION INTRAMUSCULAR; INTRAVENOUS ONCE
Status: COMPLETED | OUTPATIENT
Start: 2023-03-28 | End: 2023-03-28

## 2023-03-28 RX ORDER — MECLIZINE HYDROCHLORIDE 25 MG/1
25 TABLET ORAL ONCE
Status: COMPLETED | OUTPATIENT
Start: 2023-03-28 | End: 2023-03-28

## 2023-03-28 RX ADMIN — SODIUM CHLORIDE 1000 ML: 0.9 INJECTION, SOLUTION INTRAVENOUS at 15:05

## 2023-03-28 RX ADMIN — DIAZEPAM 5 MG: 5 INJECTION INTRAMUSCULAR; INTRAVENOUS at 15:10

## 2023-03-28 RX ADMIN — MECLIZINE HYDROCHLORIDE 25 MG: 25 TABLET ORAL at 12:47

## 2023-03-28 RX ADMIN — DIAZEPAM 5 MG: 5 TABLET ORAL at 13:52

## 2023-03-28 NOTE — ED PROVIDER NOTES
History  Chief Complaint   Patient presents with   • Dizziness     Pt reports dizziness this morning while laying and moving her head        41 y o  F p/w dizziness x this AM   Pt woke up with room spinning sensation after she turned her head to the left while laying in bed  Pt notes she does a lot of work with her head down  Symptoms better with being still  Worse with turning head to left or bending over and standing up  Associated with nausea with room spinning  Denies F/C, HA, focal deficits, recent URI, tinnitus  History provided by:  Patient   used: No    Dizziness  Quality:  Room spinning  Timing:  Intermittent  Progression:  Unchanged  Chronicity:  New  Context: bending over and head movement    Worsened by:  Turning head  Associated symptoms: nausea    Associated symptoms: no headaches, no tinnitus, no vomiting and no weakness        Prior to Admission Medications   Prescriptions Last Dose Informant Patient Reported? Taking?   dextromethorphan-guaifenesin (MUCINEX DM)  MG per 12 hr tablet   No No   Sig: Take 1 tablet by mouth every 12 (twelve) hours   fluticasone (FLONASE) 50 mcg/act nasal spray   No No   Si spray into each nostril daily   labetalol (NORMODYNE) 200 mg tablet   Yes No   Sig: Take 200 mg by mouth every morning   levothyroxine 50 mcg tablet   Yes No   Sig: Take 50 mcg by mouth daily   naproxen (Naprosyn) 500 mg tablet   No No   Sig: Take 1 tablet (500 mg total) by mouth 2 (two) times a day with meals      Facility-Administered Medications: None       Past Medical History:   Diagnosis Date   • Disease of thyroid gland    • Hypertension        Past Surgical History:   Procedure Laterality Date   • COSMETIC SURGERY         History reviewed  No pertinent family history  I have reviewed and agree with the history as documented      E-Cigarette/Vaping   • E-Cigarette Use Never User      E-Cigarette/Vaping Substances     Social History     Tobacco Use   • Smoking status: Never   • Smokeless tobacco: Never   Vaping Use   • Vaping Use: Never used   Substance Use Topics   • Alcohol use: Never   • Drug use: Never       Review of Systems   Constitutional: Negative for fever  HENT: Negative for ear pain and tinnitus  Respiratory: Negative for cough  Gastrointestinal: Positive for nausea  Negative for vomiting  Neurological: Positive for dizziness  Negative for weakness, light-headedness, numbness and headaches  All other systems reviewed and are negative  Physical Exam  Physical Exam  Vitals and nursing note reviewed  Constitutional:       General: She is not in acute distress  Appearance: She is well-developed  She is not ill-appearing, toxic-appearing or diaphoretic  HENT:      Head: Normocephalic and atraumatic  Right Ear: Tympanic membrane normal  No drainage  No middle ear effusion  Tympanic membrane is not injected, erythematous or bulging  Left Ear: Tympanic membrane normal  No drainage  No middle ear effusion  Tympanic membrane is not injected, erythematous or bulging  Nose: Nose normal       Mouth/Throat:      Pharynx: Oropharynx is clear  Uvula midline  No pharyngeal swelling, oropharyngeal exudate, posterior oropharyngeal erythema or uvula swelling  Tonsils: No tonsillar exudate or tonsillar abscesses  Eyes:      General:         Right eye: No discharge  Left eye: No discharge  Extraocular Movements: Extraocular movements intact  Right eye: Nystagmus present  Left eye: Nystagmus present  Conjunctiva/sclera: Conjunctivae normal       Right eye: Right conjunctiva is not injected  Left eye: Left conjunctiva is not injected  Neck:      Trachea: Trachea and phonation normal    Cardiovascular:      Rate and Rhythm: Normal rate and regular rhythm  Heart sounds: Normal heart sounds  No murmur heard  No friction rub     Pulmonary:      Effort: Pulmonary effort is normal  No accessory muscle usage or respiratory distress  Breath sounds: Normal breath sounds  No stridor  No wheezing, rhonchi or rales  Abdominal:      General: There is no distension  Palpations: Abdomen is soft  Tenderness: There is no abdominal tenderness  There is no guarding or rebound  Musculoskeletal:      Cervical back: Normal range of motion  No rigidity  Lymphadenopathy:      Cervical: No cervical adenopathy  Skin:     General: Skin is warm and dry  Coloration: Skin is not pale  Findings: No rash  Neurological:      Mental Status: She is alert  GCS: GCS eye subscore is 4  GCS verbal subscore is 5  GCS motor subscore is 6  Psychiatric:         Behavior: Behavior is cooperative           Vital Signs  ED Triage Vitals   Temperature Pulse Respirations Blood Pressure SpO2   03/28/23 1217 03/28/23 1217 03/28/23 1217 03/28/23 1217 03/28/23 1217   98 5 °F (36 9 °C) 85 16 (!) 145/102 98 %      Temp Source Heart Rate Source Patient Position - Orthostatic VS BP Location FiO2 (%)   03/28/23 1217 03/28/23 1330 03/28/23 1330 03/28/23 1330 --   Oral Monitor Lying Right arm       Pain Score       03/28/23 1217       No Pain           Vitals:    03/28/23 1217 03/28/23 1330 03/28/23 1430 03/28/23 1602   BP: (!) 145/102 127/79 130/78 116/67   Pulse: 85 72 88 85   Patient Position - Orthostatic VS:  Lying Sitting Sitting         Visual Acuity  Visual Acuity    Flowsheet Row Most Recent Value   L Pupil Size (mm) 3   R Pupil Size (mm) 3          ED Medications  Medications   meclizine (ANTIVERT) tablet 25 mg (25 mg Oral Given 3/28/23 1247)   diazepam (VALIUM) tablet 5 mg (5 mg Oral Given 3/28/23 1352)   sodium chloride 0 9 % bolus 1,000 mL (0 mL Intravenous Stopped 3/28/23 1608)   diazepam (VALIUM) injection 5 mg (5 mg Intravenous Given 3/28/23 1510)       Diagnostic Studies  Results Reviewed     Procedure Component Value Units Date/Time    Basic metabolic panel [[id number]]  (Abnormal) Collected: 03/28/23 1504    Lab Status: Final result Specimen: Blood from Arm, Left Updated: 03/28/23 1536     Sodium 139 mmol/L      Potassium 3 6 mmol/L      Chloride 108 mmol/L      CO2 24 mmol/L      ANION GAP 7 mmol/L      BUN 8 mg/dL      Creatinine 0 59 mg/dL      Glucose 103 mg/dL      Calcium 9 7 mg/dL      eGFR 118 ml/min/1 73sq m     Narrative:      [facility] guidelines for Chronic Kidney Disease (CKD):   •  Stage 1 with normal or high GFR (GFR > 90 mL/min/1 73 square meters)  •  Stage 2 Mild CKD (GFR = 60-89 mL/min/1 73 square meters)  •  Stage 3A Moderate CKD (GFR = 45-59 mL/min/1 73 square meters)  •  Stage 3B Moderate CKD (GFR = 30-44 mL/min/1 73 square meters)  •  Stage 4 Severe CKD (GFR = 15-29 mL/min/1 73 square meters)  •  Stage 5 End Stage CKD (GFR <15 mL/min/1 73 square meters)  Note: GFR calculation is accurate only with a steady state creatinine    CBC and differential [[id number]] Collected: 03/28/23 1504    Lab Status: Final result Specimen: Blood from Arm, Left Updated: 03/28/23 1514     WBC 7 01 Thousand/uL      RBC 4 71 Million/uL      Hemoglobin 13 8 g/dL      Hematocrit 40 4 %      MCV 86 fL      MCH 29 3 pg      MCHC 34 2 g/dL      RDW 12 9 %      MPV 10 2 fL      Platelets 221 Thousands/uL      nRBC 0 /100 WBCs      Neutrophils Relative 63 %      Immat GRANS % 0 %      Lymphocytes Relative 32 %      Monocytes Relative 4 %      Eosinophils Relative 1 %      Basophils Relative 0 %      Neutrophils Absolute 4 34 Thousands/µL      Immature Grans Absolute 0 03 Thousand/uL      Lymphocytes Absolute 2 25 Thousands/µL      Monocytes Absolute 0 30 Thousand/µL      Eosinophils Absolute 0 06 Thousand/µL      Basophils Absolute 0 03 Thousands/µL                  No orders to display              Procedures  ECG 12 Lead Documentation Only    Date/Time: 3/28/2023 12:43 PM  Performed by: Sarah France DO  Authorized by: Sarah France DO     Indications / Diagnosis: DIZZINESS  ECG reviewed by me, the ED Provider: yes    Patient location:  Bedside  Previous ECG:     Previous ECG:  Compared to current    Comparison ECG info:  2/9/22    Similarity:  No change  Rate:     ECG rate:  77    ECG rate assessment: normal    Rhythm:     Rhythm: sinus rhythm    Ectopy:     Ectopy: none    QRS:     QRS axis:  Normal    QRS intervals:  Normal  ST segments:     ST segments:  Normal  T waves:     T waves: normal               ED Course  ED Course as of 03/28/23 1627   Tue Mar 28, 2023   1342 Pt reports still dizzy when she turns her head to the side  Valium ordered  1448 Pt still having dizziness when she turns her head  Will order labs, and give IV valium and IV fluids  1518 Hemoglobin: 13 8  Normal   3868 Basic metabolic panel(!)  Normal   1556 Pt reports feeling a little better  Will give referral to ENT and PO meds  SBIRT 22yo+    Flowsheet Row Most Recent Value   SBIRT (25 yo +)    In order to provide better care to our patients, we are screening all of our patients for alcohol and drug use  Would it be okay to ask you these screening questions? No Filed at: 03/28/2023 1355                    Medical Decision Making  Symptoms c/w vertigo - Will give symptomatic tx  Disposition  Final diagnoses:   Vertigo     Time reflects when diagnosis was documented in both MDM as applicable and the Disposition within this note     Time User Action Codes Description Comment    3/28/2023  3:56 PM Jeff Bobby 50 [R42] Vertigo       ED Disposition     ED Disposition   Discharge    Condition   Stable    Date/Time   Tue Mar 28, 2023  3:58 PM    Comment   Houston Tsai discharge to home/self care                 Follow-up Information     Follow up With Specialties Details Why Contact Info Additional [address] [facility] Schedule an appointment as soon as possible for a visit   [address] [place] [address] [place] [phone]  [address] [facility], [address] [place] [phone]          Discharge Medication List as of 3/28/2023  3:58 PM      START taking these medications    Details   meclizine (ANTIVERT) 25 mg tablet Take 1 tablet (25 mg total) by mouth 3 (three) times a day as needed for dizziness, Starting Tue 3/28/2023, Normal         CONTINUE these medications which have NOT CHANGED    Details   dextromethorphan-guaifenesin ([place] 598 DM)  MG per 12 hr tablet Take 1 tablet by mouth every 12 (twelve) hours, Starting Tue 11/1/2022, Normal      fluticasone (FLONASE) 50 mcg/act nasal spray 1 spray into each nostril daily, Starting Tue 11/1/2022, Normal      labetalol (NORMODYNE) 200 mg tablet Take 200 mg by mouth every morning, Historical Med      levothyroxine 50 mcg tablet Take 50 mcg by mouth daily, Historical Med      naproxen (Naprosyn) 500 mg tablet Take 1 tablet (500 mg total) by mouth 2 (two) times a day with meals, Starting Tue 11/1/2022, Normal                 PDMP Review     None          ED Provider  Electronically Signed by           [address],   03/28/23 9773

## 2023-03-28 NOTE — Clinical Note
Laverne Darío was seen and treated in our emergency department on 3/28/2023  Diagnosis:     María Diaz  may return to work on return date  She may return on this date: 03/31/2023         If you have any questions or concerns, please don't hesitate to call        [address], DO    ______________________________           _______________          _______________  [facility] [facility]                              Date                                Time

## 2023-06-07 ENCOUNTER — HOSPITAL ENCOUNTER (EMERGENCY)
Facility: HOSPITAL | Age: 38
Discharge: HOME | End: 2023-06-07
Attending: EMERGENCY MEDICINE
Payer: COMMERCIAL

## 2023-06-07 VITALS
RESPIRATION RATE: 16 BRPM | TEMPERATURE: 98.5 F | DIASTOLIC BLOOD PRESSURE: 90 MMHG | WEIGHT: 217.15 LBS | OXYGEN SATURATION: 100 % | HEART RATE: 85 BPM | SYSTOLIC BLOOD PRESSURE: 170 MMHG

## 2023-06-07 DIAGNOSIS — R07.89 NON-CARDIAC CHEST PAIN: Primary | ICD-10-CM

## 2023-06-07 LAB
ANION GAP SERPL CALCULATED.3IONS-SCNC: 8 MMOL/L (ref 4–13)
ATRIAL RATE: 79 BPM
BASOPHILS # BLD AUTO: 0.03 THOUSANDS/ÂΜL (ref 0–0.1)
BASOPHILS NFR BLD AUTO: 0 % (ref 0–1)
BUN SERPL-MCNC: 10 MG/DL (ref 5–25)
CALCIUM SERPL-MCNC: 9.4 MG/DL (ref 8.4–10.2)
CARDIAC TROPONIN I PNL SERPL HS: <2 NG/L
CHLORIDE SERPL-SCNC: 103 MMOL/L (ref 96–108)
CO2 SERPL-SCNC: 26 MMOL/L (ref 21–32)
CREAT SERPL-MCNC: 0.71 MG/DL (ref 0.6–1.3)
EOSINOPHIL # BLD AUTO: 0.08 THOUSAND/ÂΜL (ref 0–0.61)
EOSINOPHIL NFR BLD AUTO: 1 % (ref 0–6)
ERYTHROCYTE [DISTWIDTH] IN BLOOD BY AUTOMATED COUNT: 12.5 % (ref 11.6–15.1)
GFR SERPL CREATININE-BSD FRML MDRD: 109 ML/MIN/1.73SQ M
GLUCOSE SERPL-MCNC: 80 MG/DL (ref 65–140)
HCT VFR BLD AUTO: 38.1 % (ref 34.8–46.1)
HGB BLD-MCNC: 12.8 G/DL (ref 11.5–15.4)
IMM GRANULOCYTES # BLD AUTO: 0.04 THOUSAND/UL (ref 0–0.2)
IMM GRANULOCYTES NFR BLD AUTO: 1 % (ref 0–2)
LYMPHOCYTES # BLD AUTO: 3 THOUSANDS/ÂΜL (ref 0.6–4.47)
LYMPHOCYTES NFR BLD AUTO: 38 % (ref 14–44)
MCH RBC QN AUTO: 29.1 PG (ref 26.8–34.3)
MCHC RBC AUTO-ENTMCNC: 33.6 G/DL (ref 31.4–37.4)
MCV RBC AUTO: 87 FL (ref 82–98)
MONOCYTES # BLD AUTO: 0.54 THOUSAND/ÂΜL (ref 0.17–1.22)
MONOCYTES NFR BLD AUTO: 7 % (ref 4–12)
NEUTROPHILS # BLD AUTO: 4.27 THOUSANDS/ÂΜL (ref 1.85–7.62)
NEUTS SEG NFR BLD AUTO: 53 % (ref 43–75)
NRBC BLD AUTO-RTO: 0 /100 WBCS
P AXIS: 48 DEGREES
PLATELET # BLD AUTO: 238 THOUSANDS/UL (ref 149–390)
PMV BLD AUTO: 10.2 FL (ref 8.9–12.7)
POTASSIUM SERPL-SCNC: 3.3 MMOL/L (ref 3.5–5.3)
PR INTERVAL: 168 MS
QRS AXIS: 78 DEGREES
QRSD INTERVAL: 94 MS
QT INTERVAL: 396 MS
QTC INTERVAL: 454 MS
RBC # BLD AUTO: 4.4 MILLION/UL (ref 3.81–5.12)
SODIUM SERPL-SCNC: 137 MMOL/L (ref 135–147)
T WAVE AXIS: 49 DEGREES
VENTRICULAR RATE: 79 BPM
WBC # BLD AUTO: 7.96 THOUSAND/UL (ref 4.31–10.16)

## 2023-06-07 PROCEDURE — 80048 BASIC METABOLIC PNL TOTAL CA: CPT | Performed by: PHYSICIAN ASSISTANT

## 2023-06-07 PROCEDURE — 84484 ASSAY OF TROPONIN QUANT: CPT | Performed by: PHYSICIAN ASSISTANT

## 2023-06-07 PROCEDURE — 36415 COLL VENOUS BLD VENIPUNCTURE: CPT | Performed by: PHYSICIAN ASSISTANT

## 2023-06-07 PROCEDURE — 85025 COMPLETE CBC W/AUTO DIFF WBC: CPT | Performed by: PHYSICIAN ASSISTANT

## 2023-06-07 PROCEDURE — 93010 ELECTROCARDIOGRAM REPORT: CPT

## 2023-06-07 PROCEDURE — 93005 ELECTROCARDIOGRAM TRACING: CPT

## 2023-06-07 RX ORDER — METHOCARBAMOL 500 MG/1
500 TABLET, FILM COATED ORAL ONCE
Status: COMPLETED | OUTPATIENT
Start: 2023-06-07 | End: 2023-06-07

## 2023-06-07 RX ORDER — KETOROLAC TROMETHAMINE 30 MG/ML
15 INJECTION, SOLUTION INTRAMUSCULAR; INTRAVENOUS ONCE
Status: COMPLETED | OUTPATIENT
Start: 2023-06-07 | End: 2023-06-07

## 2023-06-07 RX ORDER — METHOCARBAMOL 500 MG/1
500 TABLET, FILM COATED ORAL 2 TIMES DAILY
Qty: 20 TABLET | Refills: 0 | Status: SHIPPED | OUTPATIENT
Start: 2023-06-07

## 2023-06-07 RX ADMIN — KETOROLAC TROMETHAMINE 15 MG: 30 INJECTION, SOLUTION INTRAMUSCULAR; INTRAVENOUS at 21:39

## 2023-06-07 RX ADMIN — METHOCARBAMOL 500 MG: 500 TABLET ORAL at 21:39

## 2023-06-08 NOTE — ED PROVIDER NOTES
History  Chief Complaint   Patient presents with   • Chest Pain     Patient states was moving some boxes the other day and since then has had some chest pain     This is a 31-year-old female presenting for evaluation of left-sided chest pain/pressure  Patient states symptoms started yesterday, she notes pain worsens with movement at the chest   She denies any lightheadedness, dizziness, shortness of breath, nausea or vomiting  She denies any fevers or chills  She endorses some cough that she has had for some time or some nasal congestion  She took over-the-counter medication with some improvement of symptoms  Patient states she is not sure if it is her breast implants that are bothering her  No recent trauma or injury to the area  She states whenever she bends over at work she has a pressure in that side of the chest   She denies any palpitations  History provided by:  Patient   used: No        Prior to Admission Medications   Prescriptions Last Dose Informant Patient Reported? Taking?   dextromethorphan-guaifenesin (MUCINEX DM)  MG per 12 hr tablet   No No   Sig: Take 1 tablet by mouth every 12 (twelve) hours   fluticasone (FLONASE) 50 mcg/act nasal spray   No No   Si spray into each nostril daily   labetalol (NORMODYNE) 200 mg tablet  Self Yes No   Sig: Take 200 mg by mouth every morning   levothyroxine 50 mcg tablet  Self Yes No   Sig: Take 50 mcg by mouth daily   meclizine (ANTIVERT) 25 mg tablet   No No   Sig: Take 1 tablet (25 mg total) by mouth 3 (three) times a day as needed for dizziness   naproxen (Naprosyn) 500 mg tablet   No No   Sig: Take 1 tablet (500 mg total) by mouth 2 (two) times a day with meals      Facility-Administered Medications: None       Past Medical History:   Diagnosis Date   • Disease of thyroid gland    • Hypertension        Past Surgical History:   Procedure Laterality Date   • COSMETIC SURGERY         No family history on file    I have reviewed and agree with the history as documented  E-Cigarette/Vaping   • E-Cigarette Use Never User      E-Cigarette/Vaping Substances     Social History     Tobacco Use   • Smoking status: Never   • Smokeless tobacco: Never   Vaping Use   • Vaping Use: Never used   Substance Use Topics   • Alcohol use: Never   • Drug use: Never       Review of Systems   Cardiovascular: Positive for chest pain  All other systems reviewed and are negative  Physical Exam  Physical Exam  Vitals reviewed  Constitutional:       General: She is not in acute distress  Appearance: Normal appearance  She is well-developed and well-groomed  She is not ill-appearing, toxic-appearing or diaphoretic  HENT:      Head: Normocephalic and atraumatic  Right Ear: External ear normal       Left Ear: External ear normal       Nose: Nose normal  No congestion or rhinorrhea  Mouth/Throat:      Mouth: Mucous membranes are moist       Pharynx: Oropharynx is clear  No oropharyngeal exudate or posterior oropharyngeal erythema  Eyes:      General: No scleral icterus  Right eye: No discharge  Left eye: No discharge  Extraocular Movements: Extraocular movements intact  Conjunctiva/sclera: Conjunctivae normal    Cardiovascular:      Rate and Rhythm: Normal rate and regular rhythm  Pulses: Normal pulses  Heart sounds: No murmur heard  No friction rub  No gallop  Pulmonary:      Effort: Pulmonary effort is normal  No respiratory distress  Breath sounds: Normal breath sounds  No wheezing, rhonchi or rales  Chest:      Chest wall: No tenderness  Abdominal:      General: Abdomen is flat  There is no distension  Palpations: Abdomen is soft  Tenderness: There is no abdominal tenderness  There is no right CVA tenderness, left CVA tenderness, guarding or rebound  Musculoskeletal:         General: No deformity  Normal range of motion        Cervical back: Normal range of motion and neck supple  Skin:     General: Skin is warm and dry  Coloration: Skin is not jaundiced or pale  Findings: No rash  Neurological:      General: No focal deficit present  Mental Status: She is alert  Psychiatric:         Mood and Affect: Mood normal          Behavior: Behavior normal  Behavior is cooperative           Vital Signs  ED Triage Vitals [06/07/23 2053]   Temperature Pulse Respirations Blood Pressure SpO2   98 5 °F (36 9 °C) 84 18 170/90 99 %      Temp Source Heart Rate Source Patient Position - Orthostatic VS BP Location FiO2 (%)   Oral Monitor Sitting Right arm --      Pain Score       9           Vitals:    06/07/23 2053 06/07/23 2215   BP: 170/90    Pulse: 84 85   Patient Position - Orthostatic VS: Sitting Lying         Visual Acuity      ED Medications  Medications   ketorolac (TORADOL) injection 15 mg (15 mg Intravenous Given 6/7/23 2139)   methocarbamol (ROBAXIN) tablet 500 mg (500 mg Oral Given 6/7/23 2139)       Diagnostic Studies  Results Reviewed     Procedure Component Value Units Date/Time    HS Troponin 0hr (reflex protocol) [[id number]]  (Normal) Collected: 06/07/23 2132    Lab Status: Final result Specimen: Blood from Line, Venous Updated: 06/07/23 2159     hs TnI 0hr <2 ng/L     Basic metabolic panel [[id number]]  (Abnormal) Collected: 06/07/23 2132    Lab Status: Final result Specimen: Blood from Line, Venous Updated: 06/07/23 2156     Sodium 137 mmol/L      Potassium 3 3 mmol/L      Chloride 103 mmol/L      CO2 26 mmol/L      ANION GAP 8 mmol/L      BUN 10 mg/dL      Creatinine 0 71 mg/dL      Glucose 80 mg/dL      Calcium 9 4 mg/dL      eGFR 109 ml/min/1 73sq m     Narrative:      [facility] guidelines for Chronic Kidney Disease (CKD):   •  Stage 1 with normal or high GFR (GFR > 90 mL/min/1 73 square meters)  •  Stage 2 Mild CKD (GFR = 60-89 mL/min/1 73 square meters)  •  Stage 3A Moderate CKD (GFR = 45-59 mL/min/1 73 square meters)  •  Stage 3B Moderate CKD (GFR = 30-44 mL/min/1 73 square meters)  •  Stage 4 Severe CKD (GFR = 15-29 mL/min/1 73 square meters)  •  Stage 5 End Stage CKD (GFR <15 mL/min/1 73 square meters)  Note: GFR calculation is accurate only with a steady state creatinine    CBC and differential [[id number]] Collected: 06/07/23 2132    Lab Status: Final result Specimen: Blood from Line, Venous Updated: 06/07/23 2138     WBC 7 96 Thousand/uL      RBC 4 40 Million/uL      Hemoglobin 12 8 g/dL      Hematocrit 38 1 %      MCV 87 fL      MCH 29 1 pg      MCHC 33 6 g/dL      RDW 12 5 %      MPV 10 2 fL      Platelets 031 Thousands/uL      nRBC 0 /100 WBCs      Neutrophils Relative 53 %      Immat GRANS % 1 %      Lymphocytes Relative 38 %      Monocytes Relative 7 %      Eosinophils Relative 1 %      Basophils Relative 0 %      Neutrophils Absolute 4 27 Thousands/µL      Immature Grans Absolute 0 04 Thousand/uL      Lymphocytes Absolute 3 00 Thousands/µL      Monocytes Absolute 0 54 Thousand/µL      Eosinophils Absolute 0 08 Thousand/µL      Basophils Absolute 0 03 Thousands/µL                  No orders to display              Procedures  ECG 12 Lead Documentation Only    Date/Time: 6/7/2023 9:45 PM    Performed by: Malik Roca PA-C  Authorized by: Malik Roca PA-C    Indications / Diagnosis:  Chest pain  ECG reviewed by me, the ED Provider: yes (Also reviewed by Dr Roberto Tuttle)    Patient location:  ED  Previous ECG:     Previous ECG:  Compared to current    Comparison to cardiac monitor: No    Interpretation:     Interpretation: normal    Quality:     Tracing quality:  Limited by artifact  Rate:     ECG rate:  79    ECG rate assessment: normal    Rhythm:     Rhythm: sinus rhythm    Ectopy:     Ectopy: none    QRS:     QRS axis:  Normal    QRS intervals:  Normal  Conduction:     Conduction: normal    ST segments:     ST segments:  Normal  T waves:     T waves: normal               ED Course             HEART Risk Score    Flowsheet Row Most Recent Value   Heart Score Risk Calculator    History 0 Filed at: 06/07/2023 2245   ECG 0 Filed at: 06/07/2023 2245   Age 0 Filed at: 06/07/2023 2245   Risk Factors 0 Filed at: 06/07/2023 2245   Troponin 0 Filed at: 06/07/2023 2245   HEART Score 0 Filed at: 06/07/2023 2245              Medical Decision Making      DDX including but not limited to: chest wall pain, costochondritis, pleurisy, pericarditis, myocarditis, PTX, pneumonia, GI etiology; doubt ACS or MI or dissection or PE or rhabdomyolysis  Patient presenting to the ED for evaluation of left-sided chest pain that worsens with movement of the chest as well as bending over  Patient denies any trauma or injury to the area  She took over-the-counter medication with mild improvement of symptoms exam she does not have any tenderness to palpation of the chest at all  Patient also possibly irritation from her breast implants  She denies any lightheadedness, dizziness, nausea or vomiting, shortness of breath  Will order CBC for evaluation of the infection, for evaluation of electrolyte abnormalities  Troponin for evaluation of ACS although low suspicion  EKG done in triage, normal sinus rhythm  Treat symptomatically with Toradol and Robaxin for possible muscle strain  Trop <2, heart score of 0  CBC and BMP unremarkable  Cardiac workup in the ED is negative  Unlikely cardiac source for symptoms  I do not suspect PE or dissection based on history and exam  Educated on return precautions  Stable for discharge  Prior to discharge, discharge instructions were discussed with patient at bedside  Patient was provided both verbal and written instructions  Patient is understanding of the discharge instructions and is agreeable to plan of care  Return precautions were discussed with patient bedside, patient verbalized understanding of signs and symptoms that would necessitate return to the ED  All questions were answered   Patient was comfortable with the plan of care and discharged to home  Dispo: discharge home with follow up to PCP  Patient stable, in no acute distress and non-toxic at discharge  Non-cardiac chest pain: acute illness or injury  Amount and/or Complexity of Data Reviewed  Labs: ordered  Risk  Prescription drug management  Disposition  Final diagnoses:   Non-cardiac chest pain     Time reflects when diagnosis was documented in both MDM as applicable and the Disposition within this note     Time User Action Codes Description Comment    6/7/2023 10:19 PM Jaida Vazquez Emi [R07 89] Non-cardiac chest pain       ED Disposition     ED Disposition   Discharge    Condition   Stable    Date/Time   Wed Jun 7, 2023 10:18 PM    Comment   Promise Solano discharge to home/self care                 Follow-up Information     Follow up With Specialties Details Why Contact Roshni Lucio MD [facility] Medicine Schedule an appointment as soon as possible for a visit   [address] [place] [id number]  [phone]            Discharge Medication List as of 6/7/2023 10:25 PM      START taking these medications    Details   methocarbamol (ROBAXIN) 500 mg tablet Take 1 tablet (500 mg total) by mouth 2 (two) times a day, Starting Wed 6/7/2023, Normal         CONTINUE these medications which have NOT CHANGED    Details   dextromethorphan-guaifenesin ([place] 598 DM)  MG per 12 hr tablet Take 1 tablet by mouth every 12 (twelve) hours, Starting Tue 11/1/2022, Normal      fluticasone (FLONASE) 50 mcg/act nasal spray 1 spray into each nostril daily, Starting Tue 11/1/2022, Normal      labetalol (NORMODYNE) 200 mg tablet Take 200 mg by mouth every morning, Historical Med      levothyroxine 50 mcg tablet Take 50 mcg by mouth daily, Historical Med      meclizine (ANTIVERT) 25 mg tablet Take 1 tablet (25 mg total) by mouth 3 (three) times a day as needed for dizziness, Starting Tue 3/28/2023, Normal      naproxen (Naprosyn) 500 mg tablet Take 1 tablet (500 mg total) by mouth 2 (two) times a day with meals, Starting Tue 11/1/2022, Normal             No discharge procedures on file      PDMP Review     None          ED Provider  Electronically Signed by [facility] Hospital[facility]REG  06/07/23 6124

## 2023-10-17 ENCOUNTER — HOSPITAL ENCOUNTER (EMERGENCY)
Facility: HOSPITAL | Age: 38
Discharge: HOME | End: 2023-10-17
Attending: EMERGENCY MEDICINE
Payer: COMMERCIAL

## 2023-10-17 VITALS
OXYGEN SATURATION: 100 % | SYSTOLIC BLOOD PRESSURE: 147 MMHG | DIASTOLIC BLOOD PRESSURE: 73 MMHG | HEART RATE: 82 BPM | RESPIRATION RATE: 19 BRPM | TEMPERATURE: 98.3 F | WEIGHT: 216.49 LBS

## 2023-10-17 DIAGNOSIS — R42 VERTIGO: Primary | ICD-10-CM

## 2023-10-17 PROCEDURE — 99284 EMERGENCY DEPT VISIT MOD MDM: CPT | Performed by: EMERGENCY MEDICINE

## 2023-10-17 PROCEDURE — 99283 EMERGENCY DEPT VISIT LOW MDM: CPT

## 2023-10-17 RX ORDER — DIAZEPAM 2 MG/1
5 TABLET ORAL EVERY 8 HOURS PRN
Qty: 15 TABLET | Refills: 0 | Status: SHIPPED | OUTPATIENT
Start: 2023-10-17 | End: 2023-10-27

## 2023-10-17 RX ORDER — FLUTICASONE PROPIONATE 50 MCG
1 SPRAY, SUSPENSION (ML) NASAL DAILY
Qty: 16 G | Refills: 0 | Status: SHIPPED | OUTPATIENT
Start: 2023-10-17

## 2023-10-17 NOTE — ED PROVIDER NOTES
History  Chief Complaint   Patient presents with    Dizziness     C/o of dizziness for a few days. Denies N/V. Hx of vertigo     70-year-old female with history of hypertension, previous episodes of vertigo presents to the ED with a 2-day history of vertigo. She states she notices this when she turns specifically on her right side or looks up or down. She has had no headaches, nausea or vomiting. No ear complaints. She states she was given a number for ENT but has not yet followed up. She states she has taken meclizine without relief. History provided by:  Patient   used: No    Dizziness  Quality:  Vertigo  Onset quality:  Gradual  Duration:  2 days  Timing:  Intermittent  Progression:  Waxing and waning  Chronicity:  Recurrent  Context: head movement    Relieved by:  Nothing  Worsened by:  Turning head  Ineffective treatments: Meclizine. Associated symptoms: no blood in stool, no chest pain, no diarrhea, no headaches, no hearing loss, no nausea, no palpitations, no shortness of breath, no syncope, no tinnitus, no vision changes, no vomiting and no weakness    Risk factors: hx of vertigo        Prior to Admission Medications   Prescriptions Last Dose Informant Patient Reported?  Taking?   dextromethorphan-guaifenesin (MUCINEX DM)  MG per 12 hr tablet   No No   Sig: Take 1 tablet by mouth every 12 (twelve) hours   fluticasone (FLONASE) 50 mcg/act nasal spray   No No   Si spray into each nostril daily   labetalol (NORMODYNE) 200 mg tablet  Self Yes No   Sig: Take 200 mg by mouth every morning   levothyroxine 50 mcg tablet  Self Yes No   Sig: Take 50 mcg by mouth daily   meclizine (ANTIVERT) 25 mg tablet   No No   Sig: Take 1 tablet (25 mg total) by mouth 3 (three) times a day as needed for dizziness   methocarbamol (ROBAXIN) 500 mg tablet   No No   Sig: Take 1 tablet (500 mg total) by mouth 2 (two) times a day   naproxen (Naprosyn) 500 mg tablet   No No   Sig: Take 1 tablet (500 mg total) by mouth 2 (two) times a day with meals      Facility-Administered Medications: None       Past Medical History:   Diagnosis Date    Disease of thyroid gland     Hypertension        Past Surgical History:   Procedure Laterality Date    COSMETIC SURGERY         History reviewed. No pertinent family history. I have reviewed and agree with the history as documented. E-Cigarette/Vaping    E-Cigarette Use Never User      E-Cigarette/Vaping Substances     Social History     Tobacco Use    Smoking status: Never    Smokeless tobacco: Never   Vaping Use    Vaping Use: Never used   Substance Use Topics    Alcohol use: Never    Drug use: Never       Review of Systems   Constitutional: Negative. Negative for chills, diaphoresis, fatigue and fever. HENT: Negative. Negative for congestion, hearing loss, rhinorrhea, sore throat and tinnitus. Eyes: Negative. Negative for discharge, redness and itching. Respiratory: Negative. Negative for apnea, cough, chest tightness, shortness of breath and wheezing. Cardiovascular:  Negative for chest pain, palpitations, leg swelling and syncope. Gastrointestinal: Negative. Negative for abdominal pain, blood in stool, diarrhea, nausea and vomiting. Endocrine: Negative. Genitourinary: Negative. Negative for flank pain, frequency and urgency. Musculoskeletal: Negative. Negative for back pain. Skin: Negative. Allergic/Immunologic: Negative. Neurological:  Positive for dizziness. Negative for tremors, seizures, syncope, facial asymmetry, speech difficulty, weakness, light-headedness, numbness and headaches. Hematological: Negative. All other systems reviewed and are negative. Physical Exam  Physical Exam  Vitals and nursing note reviewed. Constitutional:       General: She is awake. She is not in acute distress. Appearance: Normal appearance. She is well-developed and overweight. She is not ill-appearing, toxic-appearing or diaphoretic. HENT:      Head: Normocephalic and atraumatic. Right Ear: Tympanic membrane and external ear normal.      Left Ear: Tympanic membrane and external ear normal.      Nose: Nose normal.      Mouth/Throat:      Mouth: Mucous membranes are moist.      Pharynx: No oropharyngeal exudate. Eyes:      General: No scleral icterus. Right eye: No discharge. Left eye: No discharge. Extraocular Movements: Extraocular movements intact. Right eye: No nystagmus. Left eye: No nystagmus. Conjunctiva/sclera: Conjunctivae normal.      Pupils: Pupils are equal, round, and reactive to light. Neck:      Thyroid: No thyromegaly. Vascular: No JVD. Trachea: No tracheal deviation. Cardiovascular:      Rate and Rhythm: Normal rate and regular rhythm. Heart sounds: Normal heart sounds. No murmur heard. No friction rub. No gallop. Pulmonary:      Effort: Pulmonary effort is normal. No respiratory distress. Breath sounds: Normal breath sounds. No stridor. No wheezing, rhonchi or rales. Chest:      Chest wall: No tenderness. Musculoskeletal:         General: No tenderness or deformity. Normal range of motion. Cervical back: Normal range of motion and neck supple. Right lower leg: No edema. Left lower leg: No edema. Lymphadenopathy:      Cervical: No cervical adenopathy. Skin:     General: Skin is warm and dry. Coloration: Skin is not jaundiced or pale. Findings: No bruising, erythema, lesion or rash. Neurological:      General: No focal deficit present. Mental Status: She is alert and oriented to person, place, and time. Motor: No weakness or abnormal muscle tone. Coordination: Coordination normal.      Gait: Gait normal.      Deep Tendon Reflexes: Reflexes are normal and symmetric. Psychiatric:         Mood and Affect: Mood normal.         Behavior: Behavior is cooperative.          Vital Signs  ED Triage Vitals [10/17/23 1209]   Temperature Pulse Respirations Blood Pressure SpO2   98.3 °F (36.8 °C) 82 19 147/73 100 %      Temp Source Heart Rate Source Patient Position - Orthostatic VS BP Location FiO2 (%)   Oral Monitor Sitting Right arm --      Pain Score       No Pain           Vitals:    10/17/23 1209   BP: 147/73   Pulse: 82   Patient Position - Orthostatic VS: Sitting         Visual Acuity      ED Medications  Medications - No data to display    Diagnostic Studies  Results Reviewed       None                   No orders to display              Procedures  Procedures         ED Course                               SBIRT 20yo+      Flowsheet Row Most Recent Value   Initial Alcohol Screen: US AUDIT-C     1. How often do you have a drink containing alcohol? 0 Filed at: 10/17/2023 1211   2. How many drinks containing alcohol do you have on a typical day you are drinking? 0 Filed at: 10/17/2023 1211   3b. FEMALE Any Age, or MALE 65+: How often do you have 4 or more drinks on one occassion? 0 Filed at: 10/17/2023 1211   Audit-C Score 0 Filed at: 10/17/2023 1211   PJ: How many times in the past year have you. .. Used an illegal drug or used a prescription medication for non-medical reasons? Never Filed at: 10/17/2023 1211                      Medical Decision Making  29-year-old female presents to the emergency department with a 2-day history of vertigo. She has had vertigo in the past and this is similar. No headache, nausea or vomiting. The vertigo seems very positional getting worse with head movement or looking up or down. On exam she is alert in no distress. She has normal ambulation. Normal cerebellar signs. She was encouraged to follow-up with ENT as previously recommended. She states the meclizine has not helped so will prescribe low-dose Valium for symptom control. We will also give Flonase as she states she has had recent congestion.   She is stable for discharge             Disposition  Final diagnoses: Vertigo     Time reflects when diagnosis was documented in both MDM as applicable and the Disposition within this note       Time User Action Codes Description Comment    10/17/2023 12:32 PM Lissett Melchor Add [R42] Vertigo           ED Disposition       ED Disposition   Discharge    Condition   Good    Date/Time   Tue Oct 17, 2023 1232    [facility] discharge to home/self care. Follow-up Information       Follow up With Specialties Details Why Keli Nava MD [facility] Schedule an appointment as soon as possible for a visit in 2 days If symptoms worsen [address] [facility] [id number] [phone]              Patient's Medications   Discharge Prescriptions    DIAZEPAM (VALIUM) 2 MG TABLET    Take 2.5 tablets (5 mg total) by mouth every 8 (eight) hours as needed (Vertigo) for up to 10 days       Start Date: 10/17/2023End Date: 10/27/2023       Order Dose: 5 mg       Quantity: 15 tablet    Refills: 0    FLUTICASONE (FLONASE) 50 MCG/ACT NASAL SPRAY    1 spray into each nostril daily       Start Date: 10/17/2023End Date: --       Order Dose: 1 spray       Quantity: 16 g    Refills: 0       No discharge procedures on file.     PDMP Review         Value Time User    PDMP Reviewed  Yes 10/17/2023 12:32 PM Ollie Broussard DO            ED Provider  Electronically Signed by             Ollie Broussard DO  10/17/23 7477

## 2023-10-17 NOTE — Clinical Note
Adelita Herrera was seen and treated in our emergency department on 10/17/2023. No restrictions            Diagnosis:     Alanna Galeano  may return to work on return date. She may return on this date: 10/20/2023         If you have any questions or concerns, please don't hesitate to call.       Arnold Andrea, DO    ______________________________           _______________          _______________  Hospital Representative                              Date                                Time

## 2023-11-29 ENCOUNTER — HOSPITAL ENCOUNTER (EMERGENCY)
Facility: HOSPITAL | Age: 38
Discharge: HOME | End: 2023-11-29
Attending: EMERGENCY MEDICINE
Payer: COMMERCIAL

## 2023-11-29 VITALS
RESPIRATION RATE: 18 BRPM | TEMPERATURE: 98.2 F | DIASTOLIC BLOOD PRESSURE: 89 MMHG | OXYGEN SATURATION: 100 % | SYSTOLIC BLOOD PRESSURE: 130 MMHG | HEART RATE: 78 BPM

## 2023-11-29 DIAGNOSIS — H00.011 HORDEOLUM EXTERNUM OF RIGHT UPPER EYELID: Primary | ICD-10-CM

## 2023-11-29 PROCEDURE — 99284 EMERGENCY DEPT VISIT MOD MDM: CPT | Performed by: EMERGENCY MEDICINE

## 2023-11-29 PROCEDURE — 99282 EMERGENCY DEPT VISIT SF MDM: CPT

## 2023-11-29 RX ORDER — ERYTHROMYCIN 5 MG/G
0.5 OINTMENT OPHTHALMIC ONCE
Status: COMPLETED | OUTPATIENT
Start: 2023-11-29 | End: 2023-11-29

## 2023-11-29 RX ORDER — ERYTHROMYCIN 5 MG/G
OINTMENT OPHTHALMIC
Qty: 3.5 G | Refills: 0 | Status: SHIPPED | OUTPATIENT
Start: 2023-11-29

## 2023-11-29 RX ADMIN — ERYTHROMYCIN 0.5 INCH: 5 OINTMENT OPHTHALMIC at 19:01

## 2023-11-30 NOTE — ED NOTES
Erythromycin ointment education given by this RN. Pt used teach back method.       Celina Ray RN  11/29/23 5364

## 2023-12-07 NOTE — ED PROVIDER NOTES
History  Chief Complaint   Patient presents with    Eye Problem     C/o right eyelid swelling and redness x2 weeks. +pain. Denies vision changes     40 yo F coming in for eval of right upper eyelid discomfort, first started about 2 weeks ago. No vision changes. Discomfort to the eyelid only, denies eye pain or drainage. She does state she wears eye makeup and contacts. Denies fevers or chills. No other symptoms. History provided by:  Patient   used: No    Eye Problem      Prior to Admission Medications   Prescriptions Last Dose Informant Patient Reported? Taking?   dextromethorphan-guaifenesin (MUCINEX DM)  MG per 12 hr tablet   No No   Sig: Take 1 tablet by mouth every 12 (twelve) hours   diazepam (VALIUM) 2 mg tablet   No No   Sig: Take 2.5 tablets (5 mg total) by mouth every 8 (eight) hours as needed (Vertigo) for up to 10 days   fluticasone (FLONASE) 50 mcg/act nasal spray   No No   Si spray into each nostril daily   fluticasone (FLONASE) 50 mcg/act nasal spray   No No   Si spray into each nostril daily   labetalol (NORMODYNE) 200 mg tablet  Self Yes No   Sig: Take 200 mg by mouth every morning   levothyroxine 50 mcg tablet  Self Yes No   Sig: Take 50 mcg by mouth daily   meclizine (ANTIVERT) 25 mg tablet   No No   Sig: Take 1 tablet (25 mg total) by mouth 3 (three) times a day as needed for dizziness   methocarbamol (ROBAXIN) 500 mg tablet   No No   Sig: Take 1 tablet (500 mg total) by mouth 2 (two) times a day   naproxen (Naprosyn) 500 mg tablet   No No   Sig: Take 1 tablet (500 mg total) by mouth 2 (two) times a day with meals      Facility-Administered Medications: None       Past Medical History:   Diagnosis Date    Disease of thyroid gland     Hypertension        Past Surgical History:   Procedure Laterality Date    COSMETIC SURGERY         History reviewed. No pertinent family history. I have reviewed and agree with the history as documented.     E-Cigarette/Vaping E-Cigarette Use Never User      E-Cigarette/Vaping Substances     Social History     Tobacco Use    Smoking status: Never    Smokeless tobacco: Never   Vaping Use    Vaping Use: Never used   Substance Use Topics    Alcohol use: Not Currently    Drug use: Never       Review of Systems   Eyes:         Right eyelid pain and swelling   All other systems reviewed and are negative. Physical Exam  Physical Exam  Vitals and nursing note reviewed. Constitutional:       General: She is not in acute distress. Appearance: Normal appearance. She is well-developed and normal weight. She is not ill-appearing, toxic-appearing or diaphoretic. HENT:      Head: Normocephalic and atraumatic. Right Ear: External ear normal.      Left Ear: External ear normal.      Nose: Nose normal.      Mouth/Throat:      Mouth: Mucous membranes are moist.      Pharynx: Oropharynx is clear. Eyes:      General: No scleral icterus. Right eye: No discharge. Left eye: No discharge. Extraocular Movements: Extraocular movements intact. Conjunctiva/sclera: Conjunctivae normal.      Pupils: Pupils are equal, round, and reactive to light. Comments: False eyelashes in place, contact lenses also in place. There is a stye to the right upper eyelid, tender to palpation. No eye redness or drainage. Vision grossly intact. Cardiovascular:      Rate and Rhythm: Normal rate and regular rhythm. Pulses: Normal pulses. Pulmonary:      Effort: Pulmonary effort is normal.   Abdominal:      General: Abdomen is flat. There is no abdominal bruit. There are no signs of injury. Palpations: There is no shifting dullness. Genitourinary:     Adnexa: Right adnexa normal and left adnexa normal.   Musculoskeletal:         General: Normal range of motion. Cervical back: Normal range of motion and neck supple. Skin:     General: Skin is warm and dry.       Capillary Refill: Capillary refill takes less than 2 seconds. Neurological:      General: No focal deficit present. Mental Status: She is alert and oriented to person, place, and time. Mental status is at baseline. Psychiatric:         Mood and Affect: Mood normal.         Behavior: Behavior normal.         Vital Signs  ED Triage Vitals [11/29/23 1800]   Temperature Pulse Respirations Blood Pressure SpO2   98.2 °F (36.8 °C) 78 18 130/89 100 %      Temp Source Heart Rate Source Patient Position - Orthostatic VS BP Location FiO2 (%)   Oral Monitor Sitting Right arm --      Pain Score       --           Vitals:    11/29/23 1800   BP: 130/89   Pulse: 78   Patient Position - Orthostatic VS: Sitting         Visual Acuity      ED Medications  Medications   erythromycin (ILOTYCIN) 0.5 % ophthalmic ointment 0.5 inch (0.5 inches Right Eye Given 11/29/23 1901)       Diagnostic Studies  Results Reviewed       None                   No orders to display              Procedures  Procedures         ED Course                                             Medical Decision Making  42 yo F w/ right eyelid pain. Physical exam consistent with a stye (hordeolum). Recommend warm compresses 4x daily. Erythromycin ointment to the right eye. F/u with ophthalmology in the office, call tomorrow for appointment. Risk  Prescription drug management. Disposition  Final diagnoses:   Hordeolum externum of right upper eyelid     Time reflects when diagnosis was documented in both MDM as applicable and the Disposition within this note       Time User Action Codes Description Comment    11/29/2023  6:51 PM Orysia Part Add [Y16.622] Hordeolum externum of right upper eyelid           ED Disposition       ED Disposition   Discharge    Condition   Stable    Date/Time   Wed Nov 29, 2023  6:51 PM    Comment   Minda Beltre discharge to home/self care.                    Follow-up Information       Follow up With Specialties Details Why Yen Sabillon MD Ophthalmology  eye doctor - call tomorrow [address]  [phone]              Discharge Medication List as of 11/29/2023  6:54 PM        START taking these medications    Details   erythromycin (ILOTYCIN) ophthalmic ointment Place a 1/2 inch ribbon of ointment into the lower eyelid. , Normal           CONTINUE these medications which have NOT CHANGED    Details   dextromethorphan-guaifenesin ([place] DM)  MG per 12 hr tablet Take 1 tablet by mouth every 12 (twelve) hours, Starting Tue 11/1/2022, Normal      diazepam (VALIUM) 2 mg tablet Take 2.5 tablets (5 mg total) by mouth every 8 (eight) hours as needed (Vertigo) for up to 10 days, Starting Tue 10/17/2023, Until Fri 10/27/2023 at 2359, Normal      !! fluticasone (FLONASE) 50 mcg/act nasal spray 1 spray into each nostril daily, Starting Tue 11/1/2022, Normal      !! fluticasone (FLONASE) 50 mcg/act nasal spray 1 spray into each nostril daily, Starting Tue 10/17/2023, Normal      labetalol (NORMODYNE) 200 mg tablet Take 200 mg by mouth every morning, Historical Med      levothyroxine 50 mcg tablet Take 50 mcg by mouth daily, Historical Med      meclizine (ANTIVERT) 25 mg tablet Take 1 tablet (25 mg total) by mouth 3 (three) times a day as needed for dizziness, Starting Tue 3/28/2023, Normal      methocarbamol (ROBAXIN) 500 mg tablet Take 1 tablet (500 mg total) by mouth 2 (two) times a day, Starting Wed 6/7/2023, Normal      naproxen (Naprosyn) 500 mg tablet Take 1 tablet (500 mg total) by mouth 2 (two) times a day with meals, Starting Tue 11/1/2022, Normal       !! - Potential duplicate medications found. Please discuss with provider. No discharge procedures on file.     PDMP Review         Value Time User    PDMP Reviewed  Yes 10/17/2023 12:32 PM Andree Sarabia DO            ED Provider  Electronically Signed by             Shemar Rodriguez DO  12/06/23 1290

## 2023-12-14 ENCOUNTER — HOSPITAL ENCOUNTER (EMERGENCY)
Facility: HOSPITAL | Age: 38
Discharge: HOME | End: 2023-12-14
Attending: EMERGENCY MEDICINE
Payer: COMMERCIAL

## 2023-12-14 VITALS
HEIGHT: 65 IN | OXYGEN SATURATION: 100 % | RESPIRATION RATE: 16 BRPM | TEMPERATURE: 97.8 F | DIASTOLIC BLOOD PRESSURE: 78 MMHG | WEIGHT: 216.05 LBS | HEART RATE: 107 BPM | BODY MASS INDEX: 36 KG/M2 | SYSTOLIC BLOOD PRESSURE: 124 MMHG

## 2023-12-14 DIAGNOSIS — B34.9 VIRAL SYNDROME: Primary | ICD-10-CM

## 2023-12-14 DIAGNOSIS — J06.9 VIRAL URI WITH COUGH: ICD-10-CM

## 2023-12-14 PROCEDURE — 99284 EMERGENCY DEPT VISIT MOD MDM: CPT | Performed by: PHYSICIAN ASSISTANT

## 2023-12-14 PROCEDURE — 99283 EMERGENCY DEPT VISIT LOW MDM: CPT

## 2023-12-14 PROCEDURE — 87636 SARSCOV2 & INF A&B AMP PRB: CPT | Performed by: PHYSICIAN ASSISTANT

## 2023-12-14 NOTE — DISCHARGE INSTRUCTIONS
Call to schedule a follow up appointment post ED follow up to address non emergent follow up findings and or to schedule follow up exam.    Discussed use of nasal saline and nasal clearance with appropriate modalities (I.e. blowing, v. Suction, etc). Discussed use of saline nebs for lower respiratory clearance. Techniques for upper and lower airway clearance prior to meals, recumbency, periods of sleep, etc.     Discussed return emergency department for any newly developing or worsening signs or symptoms. Patient understood all instructions prior to discharge and plan agreed upon by patient and myself. Discussed overall common severe and commonly common side effects of prescription medication and advised review of all prescription package inserts for personal review prior to taking prescribed medications. Use over the counter (OTC) medications for your symptoms. Pain:  Use Acetaminophen (Tylenol) as directed by packaging as needed. Pain:  Use Ibuprofen (Motrin, Advil, etc.) as directed by packaging as needed. The Emergency Department will contact you with any positive test results or otherwise results requiring treatment. If test results negative you will likely not be contacted if no change in treatment required. Additionally results can be reviewed in real-time at your convenience through [address] [place] [address].

## 2023-12-14 NOTE — Clinical Note
Evelyn Wei was seen and treated in our emergency department on 12/14/2023. Diagnosis:     Teddy Duncan  is off the rest of the shift today. She may return on this date: 12/18/2023    May return to work if > 24 hours without fever. Follow CDC guidelines for diagnosed viral illness. If you have any questions or concerns, please don't hesitate to call.       Sabrina Carl PA-C    ______________________________           _______________          _______________  Hospital Representative                              Date                                Time

## 2023-12-15 ENCOUNTER — HOSPITAL ENCOUNTER (EMERGENCY)
Facility: HOSPITAL | Age: 38
Discharge: HOME | End: 2023-12-15
Attending: EMERGENCY MEDICINE
Payer: COMMERCIAL

## 2023-12-15 ENCOUNTER — APPOINTMENT (EMERGENCY)
Dept: RADIOLOGY | Facility: HOSPITAL | Age: 38
End: 2023-12-15
Payer: COMMERCIAL

## 2023-12-15 VITALS
RESPIRATION RATE: 18 BRPM | TEMPERATURE: 98.1 F | OXYGEN SATURATION: 97 % | DIASTOLIC BLOOD PRESSURE: 79 MMHG | SYSTOLIC BLOOD PRESSURE: 112 MMHG | HEART RATE: 80 BPM

## 2023-12-15 DIAGNOSIS — R55 SYNCOPE: Primary | ICD-10-CM

## 2023-12-15 DIAGNOSIS — U07.1 COVID: ICD-10-CM

## 2023-12-15 DIAGNOSIS — R79.89 LFTS ABNORMAL: ICD-10-CM

## 2023-12-15 LAB
ALBUMIN SERPL BCP-MCNC: 4.5 G/DL (ref 3.5–5)
ALP SERPL-CCNC: 38 U/L (ref 34–104)
ALT SERPL W P-5'-P-CCNC: 84 U/L (ref 7–52)
ANION GAP SERPL CALCULATED.3IONS-SCNC: 8 MMOL/L
AST SERPL W P-5'-P-CCNC: 66 U/L (ref 13–39)
BASOPHILS # BLD AUTO: 0.03 THOUSANDS/ÂΜL (ref 0–0.1)
BASOPHILS NFR BLD AUTO: 1 % (ref 0–1)
BILIRUB SERPL-MCNC: 0.53 MG/DL (ref 0.2–1)
BUN SERPL-MCNC: 8 MG/DL (ref 5–25)
CALCIUM SERPL-MCNC: 8.8 MG/DL (ref 8.4–10.2)
CARDIAC TROPONIN I PNL SERPL HS: <2 NG/L
CHLORIDE SERPL-SCNC: 105 MMOL/L (ref 96–108)
CO2 SERPL-SCNC: 24 MMOL/L (ref 21–32)
CREAT SERPL-MCNC: 0.6 MG/DL (ref 0.6–1.3)
EOSINOPHIL # BLD AUTO: 0.05 THOUSAND/ÂΜL (ref 0–0.61)
EOSINOPHIL NFR BLD AUTO: 1 % (ref 0–6)
ERYTHROCYTE [DISTWIDTH] IN BLOOD BY AUTOMATED COUNT: 12.8 % (ref 11.6–15.1)
FLUAV RNA RESP QL NAA+PROBE: NEGATIVE
FLUBV RNA RESP QL NAA+PROBE: NEGATIVE
GFR SERPL CREATININE-BSD FRML MDRD: 117 ML/MIN/1.73SQ M
GLUCOSE SERPL-MCNC: 90 MG/DL (ref 65–140)
HCT VFR BLD AUTO: 38.5 % (ref 34.8–46.1)
HGB BLD-MCNC: 13.3 G/DL (ref 11.5–15.4)
IMM GRANULOCYTES # BLD AUTO: 0.03 THOUSAND/UL (ref 0–0.2)
IMM GRANULOCYTES NFR BLD AUTO: 1 % (ref 0–2)
LYMPHOCYTES # BLD AUTO: 1.63 THOUSANDS/ÂΜL (ref 0.6–4.47)
LYMPHOCYTES NFR BLD AUTO: 29 % (ref 14–44)
MCH RBC QN AUTO: 30 PG (ref 26.8–34.3)
MCHC RBC AUTO-ENTMCNC: 34.5 G/DL (ref 31.4–37.4)
MCV RBC AUTO: 87 FL (ref 82–98)
MONOCYTES # BLD AUTO: 0.82 THOUSAND/ÂΜL (ref 0.17–1.22)
MONOCYTES NFR BLD AUTO: 15 % (ref 4–12)
NEUTROPHILS # BLD AUTO: 3.05 THOUSANDS/ÂΜL (ref 1.85–7.62)
NEUTS SEG NFR BLD AUTO: 53 % (ref 43–75)
NRBC BLD AUTO-RTO: 0 /100 WBCS
PLATELET # BLD AUTO: 216 THOUSANDS/UL (ref 149–390)
PMV BLD AUTO: 10.2 FL (ref 8.9–12.7)
POTASSIUM SERPL-SCNC: 4.6 MMOL/L (ref 3.5–5.3)
PROT SERPL-MCNC: 7 G/DL (ref 6.4–8.4)
RBC # BLD AUTO: 4.44 MILLION/UL (ref 3.81–5.12)
SARS-COV-2 RNA RESP QL NAA+PROBE: POSITIVE
SODIUM SERPL-SCNC: 137 MMOL/L (ref 135–147)
WBC # BLD AUTO: 5.61 THOUSAND/UL (ref 4.31–10.16)

## 2023-12-15 PROCEDURE — 84484 ASSAY OF TROPONIN QUANT: CPT | Performed by: EMERGENCY MEDICINE

## 2023-12-15 PROCEDURE — 93005 ELECTROCARDIOGRAM TRACING: CPT

## 2023-12-15 PROCEDURE — 99284 EMERGENCY DEPT VISIT MOD MDM: CPT

## 2023-12-15 PROCEDURE — 71045 X-RAY EXAM CHEST 1 VIEW: CPT

## 2023-12-15 PROCEDURE — 85025 COMPLETE CBC W/AUTO DIFF WBC: CPT | Performed by: EMERGENCY MEDICINE

## 2023-12-15 PROCEDURE — 80053 COMPREHEN METABOLIC PANEL: CPT | Performed by: EMERGENCY MEDICINE

## 2023-12-15 PROCEDURE — 36415 COLL VENOUS BLD VENIPUNCTURE: CPT

## 2023-12-15 PROCEDURE — 99284 EMERGENCY DEPT VISIT MOD MDM: CPT | Performed by: EMERGENCY MEDICINE

## 2023-12-15 NOTE — ED PROVIDER NOTES
History  Chief Complaint   Patient presents with    Syncope     Had syncopal episode today, dx with covid yesterday. Did not eat or drink yet today. 42 y/o female patient with hx of HTN, hypothyroid presenting with syncope onset today. states an hour ago, standing in front of fridge, felt nauseous and lightheaded. States syncopized. States boyrfiend hellped her to floor. No evidence of tongue biting, urinary incotniencne. , or shaking. No head trauma or blood thinners. States feels back to her normal self. States is currently at baseline. Recently dx with covid yesterday. Prior to Admission Medications   Prescriptions Last Dose Informant Patient Reported? Taking?   dextromethorphan-guaifenesin (MUCINEX DM)  MG per 12 hr tablet   No No   Sig: Take 1 tablet by mouth every 12 (twelve) hours   diazepam (VALIUM) 2 mg tablet   No No   Sig: Take 2.5 tablets (5 mg total) by mouth every 8 (eight) hours as needed (Vertigo) for up to 10 days   erythromycin (ILOTYCIN) ophthalmic ointment   No No   Sig: Place a 1/2 inch ribbon of ointment into the lower eyelid.    fluticasone (FLONASE) 50 mcg/act nasal spray   No No   Si spray into each nostril daily   fluticasone (FLONASE) 50 mcg/act nasal spray   No No   Si spray into each nostril daily   labetalol (NORMODYNE) 200 mg tablet  Self Yes No   Sig: Take 200 mg by mouth every morning   levothyroxine 50 mcg tablet  Self Yes No   Sig: Take 50 mcg by mouth daily   meclizine (ANTIVERT) 25 mg tablet   No No   Sig: Take 1 tablet (25 mg total) by mouth 3 (three) times a day as needed for dizziness   methocarbamol (ROBAXIN) 500 mg tablet   No No   Sig: Take 1 tablet (500 mg total) by mouth 2 (two) times a day   naproxen (Naprosyn) 500 mg tablet   No No   Sig: Take 1 tablet (500 mg total) by mouth 2 (two) times a day with meals      Facility-Administered Medications: None       Past Medical History:   Diagnosis Date    Disease of thyroid gland     Hypertension Past Surgical History:   Procedure Laterality Date    COSMETIC SURGERY         History reviewed. No pertinent family history. I have reviewed and agree with the history as documented. E-Cigarette/Vaping    E-Cigarette Use Never User      E-Cigarette/Vaping Substances     Social History     Tobacco Use    Smoking status: Never    Smokeless tobacco: Never   Vaping Use    Vaping status: Never Used   Substance Use Topics    Alcohol use: Not Currently    Drug use: Never        Review of Systems   Neurological:  Positive for syncope and light-headedness. All other systems reviewed and are negative. Physical Exam  ED Triage Vitals   Temperature Pulse Respirations Blood Pressure SpO2   12/15/23 1346 12/15/23 1339 12/15/23 1339 12/15/23 1339 12/15/23 1339   98.1 °F (36.7 °C) 80 18 112/79 97 %      Temp Source Heart Rate Source Patient Position - Orthostatic VS BP Location FiO2 (%)   12/15/23 1346 12/15/23 1339 -- 12/15/23 1339 --   Oral Monitor  Right arm       Pain Score       --                    Orthostatic Vital Signs  Vitals:    12/15/23 1339   BP: 112/79   Pulse: 80       Physical Exam  Vitals reviewed. Constitutional:       Appearance: Normal appearance. HENT:      Head: Normocephalic and atraumatic. Nose: Nose normal.      Mouth/Throat:      Mouth: Mucous membranes are moist.      Pharynx: Oropharynx is clear. Eyes:      Extraocular Movements: Extraocular movements intact. Conjunctiva/sclera: Conjunctivae normal.   Cardiovascular:      Rate and Rhythm: Normal rate and regular rhythm. Pulses: Normal pulses. Heart sounds: Normal heart sounds. Pulmonary:      Effort: Pulmonary effort is normal.      Breath sounds: Normal breath sounds. Abdominal:      General: Bowel sounds are normal.      Palpations: Abdomen is soft. Tenderness: There is no abdominal tenderness. Musculoskeletal:         General: Normal range of motion. Cervical back: Normal range of motion. Skin:     General: Skin is warm and dry. Neurological:      General: No focal deficit present. Mental Status: She is alert and oriented to person, place, and time. Mental status is at baseline.          ED Medications  Medications - No data to display    Diagnostic Studies  Results Reviewed       Procedure Component Value Units Date/Time    Comprehensive metabolic panel [[id number]]  (Abnormal) Collected: 12/15/23 1343    Lab Status: Final result Specimen: Blood from Arm, Left Updated: 12/15/23 1424     Sodium 137 mmol/L      Potassium 4.6 mmol/L      Chloride 105 mmol/L      CO2 24 mmol/L      ANION GAP 8 mmol/L      BUN 8 mg/dL      Creatinine 0.60 mg/dL      Glucose 90 mg/dL      Calcium 8.8 mg/dL      AST 66 U/L      ALT 84 U/L      Alkaline Phosphatase 38 U/L      Total Protein 7.0 g/dL      Albumin 4.5 g/dL      Total Bilirubin 0.53 mg/dL      eGFR 117 ml/min/1.73sq m     Narrative:      [facility] guidelines for Chronic Kidney Disease (CKD):     Stage 1 with normal or high GFR (GFR > 90 mL/min/1.73 square meters)    Stage 2 Mild CKD (GFR = 60-89 mL/min/1.73 square meters)    Stage 3A Moderate CKD (GFR = 45-59 mL/min/1.73 square meters)    Stage 3B Moderate CKD (GFR = 30-44 mL/min/1.73 square meters)    Stage 4 Severe CKD (GFR = 15-29 mL/min/1.73 square meters)    Stage 5 End Stage CKD (GFR <15 mL/min/1.73 square meters)  Note: GFR calculation is accurate only with a steady state creatinine    HS Troponin 0hr (reflex protocol) [[id number]]  (Normal) Collected: 12/15/23 1343    Lab Status: Final result Specimen: Blood from Arm, Left Updated: 12/15/23 1418     hs TnI 0hr <2 ng/L     CBC and differential [[id number]]  (Abnormal) Collected: 12/15/23 1343    Lab Status: Final result Specimen: Blood from Arm, Left Updated: 12/15/23 1400     WBC 5.61 Thousand/uL      RBC 4.44 Million/uL      Hemoglobin 13.3 g/dL      Hematocrit 38.5 %      MCV 87 fL      MCH 30.0 pg      MCHC 34.5 g/dL RDW 12.8 %      MPV 10.2 fL      Platelets 800 Thousands/uL      nRBC 0 /100 WBCs      Neutrophils Relative 53 %      Immat GRANS % 1 %      Lymphocytes Relative 29 %      Monocytes Relative 15 %      Eosinophils Relative 1 %      Basophils Relative 1 %      Neutrophils Absolute 3.05 Thousands/µL      Immature Grans Absolute 0.03 Thousand/uL      Lymphocytes Absolute 1.63 Thousands/µL      Monocytes Absolute 0.82 Thousand/µL      Eosinophils Absolute 0.05 Thousand/µL      Basophils Absolute 0.03 Thousands/µL                    XR chest 1 view portable    (Results Pending)         Procedures  Procedures      ED Course  ED Course as of 12/15/23 1837   Fri Dec 15, 2023   1412 EKG: normal EKG, normal sinus rhythm, unchanged from previous tracings                                         Medical Decision Making  28-year-old female patient presenting with syncopal episode. Patient diagnosed with COVID yesterday. Patient had little to eat or drink today. DDx includes cardiogenic, vasovagal, orthostatic syncope. Labs showed mildly elevated LFTs however troponin negative. Patient had no chest pain or shortness of breath. EKG within normal limits. Likely vasovagal.  Stable for discharge with follow-up with PCP. Return precautions given. Discussed results with patient. Amount and/or Complexity of Data Reviewed  Labs: ordered. Radiology: ordered.           Disposition  Final diagnoses:   Syncope   COVID   LFTs abnormal     Time reflects when diagnosis was documented in both MDM as applicable and the Disposition within this note       Time User Action Codes Description Comment    12/15/2023  2:46 PM Jaison HAWK [facility] Add [R55] Syncope     12/15/2023  2:46 PM Jaison Barrera Seok Add [U07.1] COVID     12/15/2023  2:46 PM Gera Mark Add [R79.89] LFTs abnormal           ED Disposition       ED Disposition   Discharge    Condition   Stable    Date/Time   Fri Dec 15, 2023  2:46 PM    Comment   Jay Rivas discharge to home/self care. Follow-up Information       Follow up With Specialties Details Why Contact Info    Viktor Merino MD [facility] Medicine Schedule an appointment as soon as possible for a visit   [place] [phone]              Discharge Medication List as of 12/15/2023  2:46 PM        CONTINUE these medications which have NOT CHANGED    Details   dextromethorphan-guaifenesin (MUCINEX DM)  MG per 12 hr tablet Take 1 tablet by mouth every 12 (twelve) hours, Starting Tue 11/1/2022, Normal      diazepam (VALIUM) 2 mg tablet Take 2.5 tablets (5 mg total) by mouth every 8 (eight) hours as needed (Vertigo) for up to 10 days, Starting Tue 10/17/2023, Until Fri 10/27/2023 at 2359, Normal      erythromycin (ILOTYCIN) ophthalmic ointment Place a 1/2 inch ribbon of ointment into the lower eyelid. , Normal      !! fluticasone (FLONASE) 50 mcg/act nasal spray 1 spray into each nostril daily, Starting Tue 11/1/2022, Normal      !! fluticasone (FLONASE) 50 mcg/act nasal spray 1 spray into each nostril daily, Starting Tue 10/17/2023, Normal      labetalol (NORMODYNE) 200 mg tablet Take 200 mg by mouth every morning, Historical Med      levothyroxine 50 mcg tablet Take 50 mcg by mouth daily, Historical Med      meclizine (ANTIVERT) 25 mg tablet Take 1 tablet (25 mg total) by mouth 3 (three) times a day as needed for dizziness, Starting Tue 3/28/2023, Normal      methocarbamol (ROBAXIN) 500 mg tablet Take 1 tablet (500 mg total) by mouth 2 (two) times a day, Starting Wed 6/7/2023, Normal      naproxen (Naprosyn) 500 mg tablet Take 1 tablet (500 mg total) by mouth 2 (two) times a day with meals, Starting Tue 11/1/2022, Normal       !! - Potential duplicate medications found. Please discuss with provider. No discharge procedures on file.     PDMP Review         Value Time User    PDMP Reviewed  Yes 10/17/2023 12:32 PM Leodan Mccoy DO             ED Provider  Attending physically available and evaluated Edvin Thomson. I managed the patient along with the ED Attending.     Electronically Signed by           Dania Turcios MD  12/15/23 6825

## 2023-12-15 NOTE — ED PROVIDER NOTES
History  Chief Complaint   Patient presents with    Flu Symptoms     Reports fever/bodyaches for 2 days. HPI    41 F PMHX HTN / Hypthyroid and multiple cosmesis procedure presents w/ cough, HA, fever, bodyaches x 2 days. No n/v/d. Concerned for flu / covid. Tolerating PO and hydrating well w/o changes in urinary or stooling norms. Presents for evaluation. Her s/o also in ED w/ similar sx. Prior to Admission Medications   Prescriptions Last Dose Informant Patient Reported? Taking?   dextromethorphan-guaifenesin (MUCINEX DM)  MG per 12 hr tablet   No No   Sig: Take 1 tablet by mouth every 12 (twelve) hours   diazepam (VALIUM) 2 mg tablet   No No   Sig: Take 2.5 tablets (5 mg total) by mouth every 8 (eight) hours as needed (Vertigo) for up to 10 days   erythromycin (ILOTYCIN) ophthalmic ointment   No No   Sig: Place a 1/2 inch ribbon of ointment into the lower eyelid. fluticasone (FLONASE) 50 mcg/act nasal spray   No No   Si spray into each nostril daily   fluticasone (FLONASE) 50 mcg/act nasal spray   No No   Si spray into each nostril daily   labetalol (NORMODYNE) 200 mg tablet  Self Yes No   Sig: Take 200 mg by mouth every morning   levothyroxine 50 mcg tablet  Self Yes No   Sig: Take 50 mcg by mouth daily   meclizine (ANTIVERT) 25 mg tablet   No No   Sig: Take 1 tablet (25 mg total) by mouth 3 (three) times a day as needed for dizziness   methocarbamol (ROBAXIN) 500 mg tablet   No No   Sig: Take 1 tablet (500 mg total) by mouth 2 (two) times a day   naproxen (Naprosyn) 500 mg tablet   No No   Sig: Take 1 tablet (500 mg total) by mouth 2 (two) times a day with meals      Facility-Administered Medications: None       Past Medical History:   Diagnosis Date    Disease of thyroid gland     Hypertension        Past Surgical History:   Procedure Laterality Date    COSMETIC SURGERY         History reviewed. No pertinent family history.   I have reviewed and agree with the history as documented. E-Cigarette/Vaping    E-Cigarette Use Never User      E-Cigarette/Vaping Substances     Social History     Tobacco Use    Smoking status: Never    Smokeless tobacco: Never   Vaping Use    Vaping status: Never Used   Substance Use Topics    Alcohol use: Not Currently    Drug use: Never       Review of Systems   Constitutional:  Positive for chills and fever. HENT:  Negative for ear pain and sore throat. Eyes:  Negative for pain and visual disturbance. Respiratory:  Positive for cough. Negative for shortness of breath. Cardiovascular:  Negative for chest pain and palpitations. Gastrointestinal:  Negative for abdominal pain and vomiting. Genitourinary:  Negative for dysuria and hematuria. Musculoskeletal:  Positive for myalgias. Negative for arthralgias and back pain. Skin:  Negative for color change and rash. Neurological:  Positive for headaches. Negative for seizures and syncope. All other systems reviewed and are negative. Physical Exam  Physical Exam  Vitals and nursing note reviewed. Constitutional:       General: She is not in acute distress. Appearance: She is well-developed. HENT:      Head: Normocephalic and atraumatic. Eyes:      Conjunctiva/sclera: Conjunctivae normal.   Cardiovascular:      Rate and Rhythm: Normal rate and regular rhythm. Heart sounds: No murmur heard. Pulmonary:      Effort: Pulmonary effort is normal. No respiratory distress. Breath sounds: Normal breath sounds. Abdominal:      Palpations: Abdomen is soft. Tenderness: There is no abdominal tenderness. Musculoskeletal:         General: No swelling. Cervical back: Neck supple. Skin:     General: Skin is warm and dry. Capillary Refill: Capillary refill takes less than 2 seconds. Neurological:      Mental Status: She is alert.    Psychiatric:         Mood and Affect: Mood normal.         Vital Signs  ED Triage Vitals [12/14/23 1430]   Temperature Pulse Respirations Blood Pressure SpO2   97.8 °F (36.6 °C) (!) 107 16 124/78 100 %      Temp Source Heart Rate Source Patient Position - Orthostatic VS BP Location FiO2 (%)   Oral Monitor Sitting Right arm --      Pain Score       --           Vitals:    12/14/23 1430   BP: 124/78   Pulse: (!) 107   Patient Position - Orthostatic VS: Sitting         Visual Acuity      ED Medications  Medications - No data to display    Diagnostic Studies  Results Reviewed       Procedure Component Value Units Date/Time    FLU/COVID - if FLU clinically relevant [[id number]]  (Abnormal) Collected: 12/14/23 1446    Lab Status: Final result Specimen: Nares from Nose Updated: 12/15/23 1123     SARS-CoV-2 Positive     INFLUENZA A PCR Negative     INFLUENZA B PCR Negative    Narrative:      FOR PEDIATRIC PATIENTS - copy/paste COVID Guidelines URL to browser: [email or web address]    SARS-CoV-2 assay is a Nucleic Acid Amplification assay intended for the  qualitative detection of nucleic acid from SARS-CoV-2 in nasopharyngeal  swabs. Results are for the presumptive identification of SARS-CoV-2 RNA. Positive results are indicative of infection with SARS-CoV-2, the virus  causing COVID-19, but do not rule out bacterial infection or co-infection  with other viruses. Laboratories within the [facility] and its  territories are required to report all positive results to the appropriate  public health authorities. Negative results do not preclude SARS-CoV-2  infection and should not be used as the sole basis for treatment or other  patient management decisions. Negative results must be combined with  clinical observations, patient history, and epidemiological information. This test has not been FDA cleared or approved. This test has been authorized by FDA under an Emergency Use Authorization  (EUA).  This test is only authorized for the duration of time the  declaration that circumstances exist justifying the authorization of the  emergency use of an in vitro diagnostic tests for detection of SARS-CoV-2  virus and/or diagnosis of COVID-19 infection under section 564(b)(1) of  the Act, 21 U. S.C. 800VVZ-4(A)(5), unless the authorization is terminated  or revoked sooner. The test has been validated but independent review by FDA  and CLIA is pending. Test performed using the Roche lisa [id number] System: This RT-PCR assay  targets ORF1, a region unique to SARS-CoV-2. A conserved region in the  E-gene was chosen for pan-Sarbecovirus detection which includes  SARS-CoV-2. According to CMS-2020-01-R, this platform meets the definition of high-throughput technology. No orders to display              Procedures  Procedures         ED Course             Stable ED course without acute emergent medical contion, worsening presenting condition, or deterioration. Medical Decision Making  Medical Decision Making    Straight Forward ED Course of likely viral syndrome w/o concerning features of symptoms or physical exam findings for acute emergent condition. Discussed use of nasal saline and nasal clearance with appropriate modalities (I.e. blowing, v. Suction, etc). Discussed use of saline nebs for lower respiratory clearance. Techniques for upper and lower airway clearance prior to meals, recumbency, periods of sleep, etc.       Discussed return emergency department for any newly developing or worsening signs or symptoms. Patient understood all instructions prior to discharge and plan agreed upon by patient and myself. Discussed overall common severe and commonly common side effects of prescription medication and advised review of all prescription package inserts for personal review prior to taking prescribed medications. Use over the counter (OTC) medications for your symptoms.   Pain:  Use Acetaminophen (Tylenol) as directed by packaging as needed. Pain:  Use Ibuprofen (Motrin, Advil, etc.) as directed by packaging as needed. Amount and/or Complexity of Data Reviewed  Labs: ordered. Disposition  Final diagnoses:   Viral syndrome     Time reflects when diagnosis was documented in both MDM as applicable and the Disposition within this note       Time User Action Codes Description Comment    12/14/2023  2:50 PM Domonique Middleton [B34.9] Viral syndrome     12/14/2023  3:00 PM Domonique Middleton [J06.9] Viral URI with cough           ED Disposition       ED Disposition   Discharge    Condition   Stable    Date/Time   Thu Dec 14, 2023  2:50 PM    Comment   Reynold Malachi discharge to home/self care. Follow-up Information       Follow up With Specialties Details Why Contact Marita Smith MD Family Medicine Call today Schedule follow up. [address] [place] [id number] [phone]              Discharge Medication List as of 12/14/2023  2:54 PM        CONTINUE these medications which have NOT CHANGED    Details   dextromethorphan-guaifenesin (MUCINEX DM)  MG per 12 hr tablet Take 1 tablet by mouth every 12 (twelve) hours, Starting Tue 11/1/2022, Normal      diazepam (VALIUM) 2 mg tablet Take 2.5 tablets (5 mg total) by mouth every 8 (eight) hours as needed (Vertigo) for up to 10 days, Starting Tue 10/17/2023, Until Fri 10/27/2023 at 2359, Normal      erythromycin (ILOTYCIN) ophthalmic ointment Place a 1/2 inch ribbon of ointment into the lower eyelid. , Normal      !! fluticasone (FLONASE) 50 mcg/act nasal spray 1 spray into each nostril daily, Starting Tue 11/1/2022, Normal      !! fluticasone (FLONASE) 50 mcg/act nasal spray 1 spray into each nostril daily, Starting Tue 10/17/2023, Normal      labetalol (NORMODYNE) 200 mg tablet Take 200 mg by mouth every morning, Historical Med      levothyroxine 50 mcg tablet Take 50 mcg by mouth daily, Historical Med      meclizine (ANTIVERT) 25 mg tablet Take 1 tablet (25 mg total) by mouth 3 (three) times a day as needed for dizziness, Starting Tue 3/28/2023, Normal      methocarbamol (ROBAXIN) 500 mg tablet Take 1 tablet (500 mg total) by mouth 2 (two) times a day, Starting Wed 6/7/2023, Normal      naproxen (Naprosyn) 500 mg tablet Take 1 tablet (500 mg total) by mouth 2 (two) times a day with meals, Starting Tue 11/1/2022, Normal       !! - Potential duplicate medications found. Please discuss with provider. No discharge procedures on file.     PDMP Review         Value Time User    PDMP Reviewed  Yes 10/17/2023 12:32 PM Megan Doherty DO            ED Provider  Electronically Signed by             Elena He PA-C  12/15/23 7979

## 2023-12-16 LAB
ATRIAL RATE: 78 BPM
P AXIS: 28 DEGREES
PR INTERVAL: 168 MS
QRS AXIS: 82 DEGREES
QRSD INTERVAL: 88 MS
QT INTERVAL: 402 MS
QTC INTERVAL: 458 MS
T WAVE AXIS: 64 DEGREES
VENTRICULAR RATE: 78 BPM

## 2024-01-13 ENCOUNTER — HOSPITAL ENCOUNTER (EMERGENCY)
Facility: HOSPITAL | Age: 39
Discharge: HOME | End: 2024-01-13
Attending: EMERGENCY MEDICINE
Payer: COMMERCIAL

## 2024-01-13 VITALS
HEART RATE: 74 BPM | RESPIRATION RATE: 16 BRPM | TEMPERATURE: 97.9 F | DIASTOLIC BLOOD PRESSURE: 89 MMHG | OXYGEN SATURATION: 95 % | SYSTOLIC BLOOD PRESSURE: 128 MMHG

## 2024-01-13 DIAGNOSIS — L03.213 PRESEPTAL CELLULITIS OF RIGHT EYE: Primary | ICD-10-CM

## 2024-01-13 PROCEDURE — 99282 EMERGENCY DEPT VISIT SF MDM: CPT

## 2024-01-13 PROCEDURE — 99284 EMERGENCY DEPT VISIT MOD MDM: CPT | Performed by: EMERGENCY MEDICINE

## 2024-01-13 RX ORDER — CEPHALEXIN 500 MG/1
500 CAPSULE ORAL EVERY 6 HOURS SCHEDULED
Qty: 20 CAPSULE | Refills: 0 | Status: SHIPPED | OUTPATIENT
Start: 2024-01-13 | End: 2024-01-18

## 2024-01-13 RX ORDER — CEPHALEXIN 250 MG/1
500 CAPSULE ORAL ONCE
Status: COMPLETED | OUTPATIENT
Start: 2024-01-13 | End: 2024-01-13

## 2024-01-13 RX ORDER — ERYTHROMYCIN 5 MG/G
0.5 OINTMENT OPHTHALMIC ONCE
Status: COMPLETED | OUTPATIENT
Start: 2024-01-13 | End: 2024-01-13

## 2024-01-13 RX ORDER — ERYTHROMYCIN 5 MG/G
OINTMENT OPHTHALMIC
Qty: 3.5 G | Refills: 0 | Status: SHIPPED | OUTPATIENT
Start: 2024-01-13

## 2024-01-13 RX ADMIN — CEPHALEXIN 500 MG: 250 CAPSULE ORAL at 16:28

## 2024-01-13 RX ADMIN — ERYTHROMYCIN 0.5 INCH: 5 OINTMENT OPHTHALMIC at 16:27

## 2024-01-13 NOTE — ED PROVIDER NOTES
History  Chief Complaint   Patient presents with    Eye Swelling     Pt reports eye swelling 2-3 days, used drops without relief     38-year-old female with no significant PMH who presents to the ED for right upper eyelid swelling.  Patient states back in 2023 she had a stye on the same upper eyelid which she was treated for with antibiotics.  Patient states this feels the same however, she does not have the bump that was previously there.  Patient states in the morning she had crusting of the outer edge of her eye and that there is a burning pain and excessive tearing.  Patient states her significant other also had an eye infection and they share the same pillowcases.  Patient denies any blurry vision or pain with extraocular movements. Denies chest pain, SOB, cough, abdominal pain, n/v/d, fever, chills, dizziness, lightheadedness, HA, dysuria, hematuria, hematochezia, or melena.           Prior to Admission Medications   Prescriptions Last Dose Informant Patient Reported? Taking?   dextromethorphan-guaifenesin (MUCINEX DM)  MG per 12 hr tablet   No No   Sig: Take 1 tablet by mouth every 12 (twelve) hours   diazepam (VALIUM) 2 mg tablet   No No   Sig: Take 2.5 tablets (5 mg total) by mouth every 8 (eight) hours as needed (Vertigo) for up to 10 days   erythromycin (ILOTYCIN) ophthalmic ointment   No No   Sig: Place a 1/2 inch ribbon of ointment into the lower eyelid.   fluticasone (FLONASE) 50 mcg/act nasal spray   No No   Si spray into each nostril daily   fluticasone (FLONASE) 50 mcg/act nasal spray   No No   Si spray into each nostril daily   labetalol (NORMODYNE) 200 mg tablet  Self Yes No   Sig: Take 200 mg by mouth every morning   levothyroxine 50 mcg tablet  Self Yes No   Sig: Take 50 mcg by mouth daily   meclizine (ANTIVERT) 25 mg tablet   No No   Sig: Take 1 tablet (25 mg total) by mouth 3 (three) times a day as needed for dizziness   methocarbamol (ROBAXIN) 500 mg tablet   No No    Sig: Take 1 tablet (500 mg total) by mouth 2 (two) times a day   naproxen (Naprosyn) 500 mg tablet   No No   Sig: Take 1 tablet (500 mg total) by mouth 2 (two) times a day with meals      Facility-Administered Medications: None       Past Medical History:   Diagnosis Date    Disease of thyroid gland     Hypertension        Past Surgical History:   Procedure Laterality Date    COSMETIC SURGERY         History reviewed. No pertinent family history.  I have reviewed and agree with the history as documented.    E-Cigarette/Vaping    E-Cigarette Use Never User      E-Cigarette/Vaping Substances     Social History     Tobacco Use    Smoking status: Never    Smokeless tobacco: Never   Vaping Use    Vaping status: Never Used   Substance Use Topics    Alcohol use: Not Currently    Drug use: Never        Review of Systems   Constitutional:  Negative for appetite change, chills, diaphoresis, fatigue and fever.   HENT:  Negative for congestion, ear pain, postnasal drip, rhinorrhea, sore throat and trouble swallowing.    Eyes:  Positive for pain, discharge and redness. Negative for visual disturbance.   Respiratory:  Negative for cough and shortness of breath.    Cardiovascular:  Negative for chest pain and palpitations.   Gastrointestinal:  Negative for abdominal pain, constipation, diarrhea, nausea and vomiting.   Genitourinary:  Negative for decreased urine volume, dysuria and hematuria.   Musculoskeletal:  Negative for arthralgias and back pain.   Skin:  Negative for color change and rash.   Neurological:  Negative for dizziness, seizures, syncope, weakness, light-headedness and headaches.   All other systems reviewed and are negative.      Physical Exam  ED Triage Vitals [01/13/24 1543]   Temperature Pulse Respirations Blood Pressure SpO2   97.9 °F (36.6 °C) 74 16 128/89 95 %      Temp Source Heart Rate Source Patient Position - Orthostatic VS BP Location FiO2 (%)   Oral Monitor -- Left arm --      Pain Score       --              Orthostatic Vital Signs  Vitals:    01/13/24 1543   BP: 128/89   Pulse: 74       Physical Exam  Vitals and nursing note reviewed.   Constitutional:       Appearance: Normal appearance. She is normal weight.   HENT:      Head: Normocephalic and atraumatic.      Right Ear: External ear normal.      Left Ear: External ear normal.      Nose: Nose normal.      Mouth/Throat:      Pharynx: Oropharynx is clear.   Eyes:      General:         Right eye: No discharge.         Left eye: No discharge.      Extraocular Movements: Extraocular movements intact.      Conjunctiva/sclera: Conjunctivae normal.      Pupils: Pupils are equal, round, and reactive to light.      Comments: Right upper eyelid with significant erythema and edema. No pain noted on EOM.   Cardiovascular:      Rate and Rhythm: Normal rate and regular rhythm.      Pulses: Normal pulses.      Heart sounds: Normal heart sounds.      Comments: RRR with +S1 and S2, no murmurs appreciated on exam. Peripheral pulses intact.    Pulmonary:      Effort: Pulmonary effort is normal. No respiratory distress.      Breath sounds: Normal breath sounds. No wheezing, rhonchi or rales.      Comments: CTABL with no abnormal lung sounds such as wheezes or rales appreciated on exam.     Abdominal:      General: Abdomen is flat. Bowel sounds are normal. There is no distension.      Palpations: Abdomen is soft.      Tenderness: There is no abdominal tenderness.      Comments: Soft, non tender, normo-active bowel sounds. Without rigidity, guarding, or distension.     Musculoskeletal:         General: Normal range of motion.      Cervical back: Normal range of motion.   Skin:     General: Skin is warm and dry.   Neurological:      General: No focal deficit present.      Mental Status: She is alert and oriented to person, place, and time. Mental status is at baseline.      Comments: CN II-XII intact. No focal neurologic deficits noted on exam.  5/5 strength in all extremities.  Neurovascularly intact with normal sensation and motor function.           ED Medications  Medications   erythromycin (ILOTYCIN) 0.5 % ophthalmic ointment 0.5 inch (0.5 inches Right Eye Given 1/13/24 1627)   cephalexin (KEFLEX) capsule 500 mg (500 mg Oral Given 1/13/24 1628)       Diagnostic Studies  Results Reviewed       None                   No orders to display         Procedures  Procedures      ED Course                             SBIRT 22yo+      Flowsheet Row Most Recent Value   Initial Alcohol Screen: US AUDIT-C     1. How often do you have a drink containing alcohol? 0 Filed at: 01/13/2024 6029   2. How many drinks containing alcohol do you have on a typical day you are drinking?  0 Filed at: 01/13/2024 1559   3a. Male UNDER 65: How often do you have five or more drinks on one occasion? 0 Filed at: 01/13/2024 1559   3b. FEMALE Any Age, or MALE 65+: How often do you have 4 or more drinks on one occassion? 0 Filed at: 01/13/2024 1559   Audit-C Score 0 Filed at: 01/13/2024 1559   PJ: How many times in the past year have you...    Used an illegal drug or used a prescription medication for non-medical reasons? Never Filed at: 01/13/2024 1559                  Medical Decision Making  38-year-old female with no significant PMH who presented to the ED for right upper eyelid swelling.  Upon examination at bedside, patient's right upper eyelid was noted to be significantly erythematous and edematous.  Patient was having excessive tearing and burning sensation however, no pain with extraocular movements on examination.  Patient was previously seen in the emergency department November 2023 for similar symptoms.  Patient was given 500 mg Keflex along with erythromycin ointment for her right eye.  Through shared decision making to the patient and the provider, the patient was planned for discharge.  Patient was provided a prescription for erythromycin ointment along with 5 days of Keflex.  Patient was also advised  to follow-up outpatient with her PCP and was provided a number for ophthalmology.  Patient was also instructed to return to the ED if her symptoms worsen including but not limited to pain with extraocular movements, increased erythema or edema of her right eyelid, blurry vision, inability to open her eye, fever, nausea or vomiting, lightheadedness or dizziness, or changes in her behavior.    Risk  Prescription drug management.          Disposition  Final diagnoses:   Preseptal cellulitis of right eye     Time reflects when diagnosis was documented in both MDM as applicable and the Disposition within this note       Time User Action Codes Description Comment    1/13/2024  4:12 PM QuitmanGonzález rodriguez Emi [L03.213] Preseptal cellulitis of right eye           ED Disposition       ED Disposition   Discharge    Condition   Stable    Date/Time   Sat Jan 13, 2024 1612    Comment   Mindy Vang discharge to home/self care.                   Follow-up Information       Follow up With Specialties Details Why Contact Info Additional Information    Catrina Calvin MD Family Medicine Call  As needed [facility] [facility] [address] [phone]       [facility] Emergency Department Emergency Medicine Go to  As needed [address] [place] [address]  [phone] [facility] Emergency Department, [address] [place] [address], [facility]    Dago Wells MD Ophthalmology Schedule an appointment as soon as possible for a visit   [address] [facility]  [address] [place] [address]  [phone]               Patient's Medications   Discharge Prescriptions    CEPHALEXIN (KEFLEX) 500 MG CAPSULE    Take 1 capsule (500 mg total) by mouth every 6 (six) hours for 5 days       Start Date: 1/13/2024 End Date: 1/18/2024       Order Dose: 500 mg       Quantity: 20 capsule    Refills: 0    ERYTHROMYCIN (ILOTYCIN) OPHTHALMIC OINTMENT    Place a 1/2 inch  ribbon of ointment into the lower eyelid.       Start Date: 1/13/2024 End Date: --       Order Dose: --       Quantity: 3.5 g    Refills: 0     No discharge procedures on file.    PDMP Review         Value Time User    PDMP Reviewed  Yes 10/17/2023 12:32 PM Jana Gordon DO             ED Provider  Attending physically available and evaluated Mindy Vang. I managed the patient along with the ED Attending.    Electronically Signed by           González Jones MD  01/13/24 5363

## 2024-01-13 NOTE — ED ATTENDING ATTESTATION
1/13/2024  IChuck DO, saw and evaluated the patient. I have discussed the patient with the resident/non-physician practitioner and agree with the resident's/non-physician practitioner's findings, Plan of Care, and MDM as documented in the resident's/non-physician practitioner's note, except where noted. All available labs and Radiology studies were reviewed.  I was present for key portions of any procedure(s) performed by the resident/non-physician practitioner and I was immediately available to provide assistance.       At this point I agree with the current assessment done in the Emergency Department.  I have conducted an independent evaluation of this patient a history and physical is as follows:      38-year-old female, swelling and pain of her right eyelid.,  Erythema over the past couple of days.,  No visual disturbances.,  No pain with extraocular movements.  History of a stye, states she is starting to feel a small bump in the middle of her upper eyelid consistent with previous styes that she has had in the past.,  No drainage.,  No fevers no chills    On exam, there is swelling of the right upper eyelid with erythema but feels like the beginning of a stye, there is a very small bump deep in the eyelid I do not see a head to it that would be amenable to incision and drainage at this time.,  Otherwise no pain with extraocular movements.,  Able to open and close the eye without any difficulty.,  Visual acuity is normal as per resident examination.,  No swelling of the periorbital region.      MDM  Number of Diagnoses or Management Options  Preseptal cellulitis of right eye  Diagnosis management comments:       Initial ED assessment:   38-year-old female, swelling and erythema of the right eyelid upper    Initial DDx includes but is not limited to:   Stye, preseptal cellulitis    Initial ED plan:   Antibiotic ointment, antibiotic oral, ophthalmology follow-up        Final ED summary/disposition:    After evaluation and workup in the emergency department, discharged with erythromycin ointment and Keflex                   ED Course         Critical Care Time  Procedures      Time reflects when diagnosis was documented in both MDM as applicable and the Disposition within this note       Time User Action Codes Description Comment    1/13/2024  4:12 PM Robert González Add [L03.213] Preseptal cellulitis of right eye           ED Disposition       ED Disposition   Discharge    Condition   Stable    Date/Time   Sat Jan 13, 2024  4:12 PM    Comment   Mindy Vang discharge to home/self care.                   Follow-up Information       Follow up With Specialties Details Why Contact Info Additional Information    Catrina Calvin MD Family Medicine Call  As needed [address] [facility] [address] [phone]       [facility] Emergency Department Emergency Medicine Go to  As needed [address] [place] [address]  [phone] [facility] Emergency Department, [address] [place] [address], [id number]    Dago Wells MD Ophthalmology Schedule an appointment as soon as possible for a visit   [address] [facility]  [address]  [facility] [address] [phone]

## 2024-01-16 ENCOUNTER — APPOINTMENT (EMERGENCY)
Dept: CT IMAGING | Facility: HOSPITAL | Age: 39
End: 2024-01-16
Payer: COMMERCIAL

## 2024-01-16 ENCOUNTER — HOSPITAL ENCOUNTER (EMERGENCY)
Facility: HOSPITAL | Age: 39
Discharge: HOME | End: 2024-01-16
Attending: EMERGENCY MEDICINE
Payer: COMMERCIAL

## 2024-01-16 VITALS
HEART RATE: 99 BPM | SYSTOLIC BLOOD PRESSURE: 156 MMHG | TEMPERATURE: 97.9 F | DIASTOLIC BLOOD PRESSURE: 82 MMHG | RESPIRATION RATE: 18 BRPM | OXYGEN SATURATION: 98 %

## 2024-01-16 DIAGNOSIS — R10.9 LEFT FLANK PAIN: Primary | ICD-10-CM

## 2024-01-16 DIAGNOSIS — M54.50 LEFT LOW BACK PAIN: ICD-10-CM

## 2024-01-16 LAB
BILIRUB UR QL STRIP: NEGATIVE
CLARITY UR: CLEAR
COLOR UR: NORMAL
EXT PREGNANCY TEST URINE: NEGATIVE
EXT. CONTROL: NORMAL
GLUCOSE UR STRIP-MCNC: NEGATIVE MG/DL
HGB UR QL STRIP.AUTO: NEGATIVE
KETONES UR STRIP-MCNC: NEGATIVE MG/DL
LEUKOCYTE ESTERASE UR QL STRIP: NEGATIVE
NITRITE UR QL STRIP: NEGATIVE
PH UR STRIP.AUTO: 6 [PH]
PROT UR STRIP-MCNC: NEGATIVE MG/DL
SP GR UR STRIP.AUTO: 1.01 (ref 1–1.03)
UROBILINOGEN UR STRIP-ACNC: <2 MG/DL

## 2024-01-16 PROCEDURE — 99284 EMERGENCY DEPT VISIT MOD MDM: CPT

## 2024-01-16 PROCEDURE — 74176 CT ABD & PELVIS W/O CONTRAST: CPT

## 2024-01-16 PROCEDURE — 81003 URINALYSIS AUTO W/O SCOPE: CPT

## 2024-01-16 PROCEDURE — G1004 CDSM NDSC: HCPCS

## 2024-01-16 PROCEDURE — 81025 URINE PREGNANCY TEST: CPT

## 2024-01-16 RX ORDER — ACETAMINOPHEN 325 MG/1
975 TABLET ORAL ONCE
Status: COMPLETED | OUTPATIENT
Start: 2024-01-16 | End: 2024-01-16

## 2024-01-16 RX ORDER — IBUPROFEN 600 MG/1
600 TABLET ORAL EVERY 6 HOURS PRN
Qty: 30 TABLET | Refills: 0 | Status: SHIPPED | OUTPATIENT
Start: 2024-01-16

## 2024-01-16 RX ORDER — LIDOCAINE 50 MG/G
1 PATCH TOPICAL DAILY
Qty: 15 PATCH | Refills: 0 | Status: SHIPPED | OUTPATIENT
Start: 2024-01-16

## 2024-01-16 RX ORDER — OXYCODONE HYDROCHLORIDE 5 MG/1
5 TABLET ORAL EVERY 6 HOURS PRN
Qty: 5 TABLET | Refills: 0 | Status: SHIPPED | OUTPATIENT
Start: 2024-01-16 | End: 2024-01-26

## 2024-01-16 RX ORDER — METHOCARBAMOL 500 MG/1
500 TABLET, FILM COATED ORAL 2 TIMES DAILY
Qty: 20 TABLET | Refills: 0 | Status: SHIPPED | OUTPATIENT
Start: 2024-01-16

## 2024-01-16 RX ORDER — ACETAMINOPHEN 500 MG
1000 TABLET ORAL EVERY 8 HOURS
Qty: 40 TABLET | Refills: 0 | Status: SHIPPED | OUTPATIENT
Start: 2024-01-16

## 2024-01-16 RX ORDER — OXYCODONE HYDROCHLORIDE 5 MG/1
5 TABLET ORAL ONCE
Status: COMPLETED | OUTPATIENT
Start: 2024-01-16 | End: 2024-01-16

## 2024-01-16 RX ORDER — METHOCARBAMOL 500 MG/1
500 TABLET, FILM COATED ORAL ONCE
Status: COMPLETED | OUTPATIENT
Start: 2024-01-16 | End: 2024-01-16

## 2024-01-16 RX ADMIN — METHOCARBAMOL 500 MG: 500 TABLET ORAL at 20:15

## 2024-01-16 RX ADMIN — ACETAMINOPHEN 975 MG: 325 TABLET ORAL at 20:15

## 2024-01-16 RX ADMIN — OXYCODONE HYDROCHLORIDE 5 MG: 5 TABLET ORAL at 20:15

## 2024-01-17 ENCOUNTER — TELEPHONE (OUTPATIENT)
Dept: PHYSICAL THERAPY | Facility: OTHER | Age: 39
End: 2024-01-17

## 2024-01-17 NOTE — TELEPHONE ENCOUNTER
Call placed to the patient per [facility] referral.    Spoke to the patient who is going to wait on triage at this time. She will call back if or when ready    Comp spine closed

## 2024-01-17 NOTE — DISCHARGE INSTRUCTIONS
Today I provided prescription for Tylenol, ibuprofen, Robaxin, oxycodone, lidocaine patches.  Take as directed.  Do not drive after taking oxycodone and Robaxin as they can cause drowsiness.  Follow-up with the [facility] comprehensive spine program if symptoms persist.  Return to ED for new or worsening symptoms.

## 2024-01-17 NOTE — ED PROVIDER NOTES
History  Chief Complaint   Patient presents with    Flank Pain     L sided flank pain for 2 weeks, denies radiating pain down leg, denies urinary symptoms, denies incontinence of bowel and bladder     37 yo female presents to ED for evaluation of left flank pain. Patient states that for the past 2 weeks she has been experiencing a persistent bothersome left flank pain. Denies any recent injury or trauma. Does endorse change in activity as she has been home more often. She has been laying on her side more for comfort. Denies hematuria, dysuria, increased urinary frequency, fever, chills, nausea, vomiting, shortness of breath, chest pain, abdominal pain, constipation, diarrhea, seizure, syncope.  Patient is able to pinpoint the pain as being located in the left mid low back region.  Denies radiation of the pain.  Patient states that the pain feels internal.  Pain is increased with twisting movements of the core musculature. Denies acute loss of bowel or bladder continence. Denies saddle anaesthesia. Denies numbness and tingling of extremities.     Denies hemoptysis, recent plane travel, recent long car ride, history of DVT or PE, recent surgery and/or cancer treatment, exogenous estrogen.         Prior to Admission Medications   Prescriptions Last Dose Informant Patient Reported? Taking?   cephalexin (KEFLEX) 500 mg capsule   No No   Sig: Take 1 capsule (500 mg total) by mouth every 6 (six) hours for 5 days   dextromethorphan-guaifenesin (MUCINEX DM)  MG per 12 hr tablet   No No   Sig: Take 1 tablet by mouth every 12 (twelve) hours   diazepam (VALIUM) 2 mg tablet   No No   Sig: Take 2.5 tablets (5 mg total) by mouth every 8 (eight) hours as needed (Vertigo) for up to 10 days   erythromycin (ILOTYCIN) ophthalmic ointment   No No   Sig: Place a 1/2 inch ribbon of ointment into the lower eyelid.   erythromycin (ILOTYCIN) ophthalmic ointment   No No   Sig: Place a 1/2 inch ribbon of ointment into the lower eyelid.    fluticasone (FLONASE) 50 mcg/act nasal spray   No No   Si spray into each nostril daily   fluticasone (FLONASE) 50 mcg/act nasal spray   No No   Si spray into each nostril daily   labetalol (NORMODYNE) 200 mg tablet  Self Yes No   Sig: Take 200 mg by mouth every morning   levothyroxine 50 mcg tablet  Self Yes No   Sig: Take 50 mcg by mouth daily   meclizine (ANTIVERT) 25 mg tablet   No No   Sig: Take 1 tablet (25 mg total) by mouth 3 (three) times a day as needed for dizziness   methocarbamol (ROBAXIN) 500 mg tablet   No No   Sig: Take 1 tablet (500 mg total) by mouth 2 (two) times a day   naproxen (Naprosyn) 500 mg tablet   No No   Sig: Take 1 tablet (500 mg total) by mouth 2 (two) times a day with meals      Facility-Administered Medications: None       Past Medical History:   Diagnosis Date    Disease of thyroid gland     Hypertension        Past Surgical History:   Procedure Laterality Date    COSMETIC SURGERY         History reviewed. No pertinent family history.  I have reviewed and agree with the history as documented.    E-Cigarette/Vaping    E-Cigarette Use Never User      E-Cigarette/Vaping Substances     Social History     Tobacco Use    Smoking status: Never    Smokeless tobacco: Never   Vaping Use    Vaping status: Never Used   Substance Use Topics    Alcohol use: Not Currently    Drug use: Never       Review of Systems   Constitutional:  Negative for chills, diaphoresis, fatigue and fever.   HENT:  Negative for ear pain and sore throat.    Eyes:  Negative for pain and visual disturbance.   Respiratory:  Negative for cough, shortness of breath, wheezing and stridor.    Cardiovascular:  Negative for chest pain, palpitations and leg swelling.   Gastrointestinal:  Negative for abdominal pain and vomiting.   Genitourinary:  Positive for flank pain. Negative for dysuria and hematuria.   Musculoskeletal:  Positive for back pain. Negative for arthralgias, neck pain and neck stiffness.   Skin:   Negative for color change and rash.   Neurological:  Negative for seizures and syncope.   All other systems reviewed and are negative.      Physical Exam  Physical Exam  Vitals and nursing note reviewed.   Constitutional:       General: She is not in acute distress.     Appearance: Normal appearance. She is well-developed. She is not ill-appearing, toxic-appearing or diaphoretic.   HENT:      Head: Normocephalic and atraumatic.      Nose: Nose normal.      Mouth/Throat:      Mouth: Mucous membranes are moist.      Pharynx: Oropharynx is clear. No oropharyngeal exudate or posterior oropharyngeal erythema.   Eyes:      Extraocular Movements: Extraocular movements intact.      Conjunctiva/sclera: Conjunctivae normal.      Pupils: Pupils are equal, round, and reactive to light.   Cardiovascular:      Rate and Rhythm: Normal rate and regular rhythm.      Pulses: Normal pulses.      Heart sounds: Normal heart sounds. No murmur heard.     No friction rub. No gallop.   Pulmonary:      Effort: Pulmonary effort is normal. No respiratory distress.      Breath sounds: Normal breath sounds. No stridor. No wheezing, rhonchi or rales.   Abdominal:      Palpations: Abdomen is soft.      Tenderness: There is no abdominal tenderness. There is no right CVA tenderness, left CVA tenderness, guarding or rebound.   Musculoskeletal:         General: No swelling.      Cervical back: Normal, normal range of motion and neck supple.      Thoracic back: Normal.      Lumbar back: Tenderness present. No swelling, edema, deformity, signs of trauma, lacerations or spasms.        Back:    Skin:     General: Skin is warm and dry.      Capillary Refill: Capillary refill takes less than 2 seconds.      Coloration: Skin is not jaundiced or pale.      Findings: No bruising or rash.   Neurological:      General: No focal deficit present.      Mental Status: She is alert and oriented to person, place, and time. Mental status is at baseline.   Psychiatric:          Mood and Affect: Mood normal.         Vital Signs  ED Triage Vitals   Temperature Pulse Respirations Blood Pressure SpO2   01/16/24 1959 01/16/24 1959 01/16/24 1959 01/16/24 1959 01/16/24 1959   97.9 °F (36.6 °C) 99 18 156/82 98 %      Temp Source Heart Rate Source Patient Position - Orthostatic VS BP Location FiO2 (%)   01/16/24 1959 01/16/24 1959 01/16/24 1959 01/16/24 1959 --   Oral Monitor Sitting Right arm       Pain Score       01/16/24 2015       10 - Worst Possible Pain           Vitals:    01/16/24 1959   BP: 156/82   Pulse: 99   Patient Position - Orthostatic VS: Sitting         Visual Acuity      ED Medications  Medications   acetaminophen (TYLENOL) tablet 975 mg (975 mg Oral Given 1/16/24 2015)   methocarbamol (ROBAXIN) tablet 500 mg (500 mg Oral Given 1/16/24 2015)   oxyCODONE (ROXICODONE) IR tablet 5 mg (5 mg Oral Given 1/16/24 2015)       Diagnostic Studies  Results Reviewed       Procedure Component Value Units Date/Time    UA (URINE) with reflex to Scope [[id number]] Collected: 01/16/24 2015    Lab Status: Final result Specimen: Urine, Clean Catch Updated: 01/16/24 2033     Color, UA Light Yellow     Clarity, UA Clear     Specific Gravity, UA 1.015     pH, UA 6.0     Leukocytes, UA Negative     Nitrite, UA Negative     Protein, UA Negative mg/dl      Glucose, UA Negative mg/dl      Ketones, UA Negative mg/dl      Urobilinogen, UA <2.0 mg/dl      Bilirubin, UA Negative     Occult Blood, UA Negative    POCT pregnancy, urine [[id number]]  (Normal) Resulted: 01/16/24 2021    Lab Status: Final result Updated: 01/16/24 2021     EXT Preg Test, Ur Negative     Control Valid                   CT renal stone study abdomen pelvis without contrast   Final Result by Rocío Giraldo MD (01/16 2121)      No evidence of obstructive uropathy.      Hepatic steatosis.            Workstation performed: [id number]                    Procedures  Procedures         ED Course                               SBIRT  22yo+      Flowsheet Row Most Recent Value   Initial Alcohol Screen: US AUDIT-C     1. How often do you have a drink containing alcohol? 0 Filed at: 01/16/2024 2000   2. How many drinks containing alcohol do you have on a typical day you are drinking?  0 Filed at: 01/16/2024 2000   3a. Male UNDER 65: How often do you have five or more drinks on one occasion? 0 Filed at: 01/16/2024 2000   3b. FEMALE Any Age, or MALE 65+: How often do you have 4 or more drinks on one occassion? 0 Filed at: 01/16/2024 2000   Audit-C Score 0 Filed at: 01/16/2024 2000   PJ: How many times in the past year have you...    Used an illegal drug or used a prescription medication for non-medical reasons? Never Filed at: 01/16/2024 2000            Wells' Criteria for PE      Flowsheet Row Most Recent Value   Wells' Criteria for PE    Clinical signs and symptoms of DVT 0 Filed at: 01/16/2024 2110   PE is primary diagnosis or equally likely 0 Filed at: 01/16/2024 2110   HR >100 0 Filed at: 01/16/2024 2110   Immobilization at least 3 days or Surgery in the previous 4 weeks 0 Filed at: 01/16/2024 2110   Previous, objectively diagnosed PE or DVT 0 Filed at: 01/16/2024 2110   Hemoptysis 0 Filed at: 01/16/2024 2110   Malignancy with treatment within 6 months or palliative 0 Filed at: 01/16/2024 2110   Wells' Criteria Total 0 Filed at: 01/16/2024 2110                  Medical Decision Making  37 yo female presents to ED for evaluation of left flank pain as above. On physical examination patient is normotensive, afebrile, nontachycardic, without respiratory distress.  Reproducible tenderness to palpation of the left mid lumbar back region.  No midline tenderness.  No overlying skin changes of back.  No CVA tenderness bilaterally.  Abdomen soft, nontender.  Maintained motor function of all 4 extremities.  No rash.  Benign examination.  Obtaining workup consisting of urine pregnancy, UA, CT renal study.  Providing pain control with Tylenol, Robaxin,  oxycodone.  Differential diagnosis includes but not limited to musculoskeletal back pain, urolithiasis, pyelonephritis     UA without evidence of UTI, urolithiasis.  Negative urine pregnancy.  CT renal study without evidence of acute abdomen, urinary stone.     Returned to patient to discuss results of workup.  Advised that symptoms most likely secondary to musculoskeletal origin. Patient has been laying on her side more frequently secondary to losing her job and being at home more often. Advised patient to follow up with comprehensive spine program, pain control, focus on posture, home stretches.  Contact information and referral to comprehensive spine program provided.  For pain control I provided her with prescription of Tylenol, ibuprofen, lidocaine patches, Robaxin, oxycodone.  Advised to avoid driving after taking oxycodone and/or Robaxin.  Avoid mixing alcohol with Robaxin and oxycodone.  PDMP checked and patient does not have recent narcotic pain medication prescriptions.  Advised to take Tylenol, ibuprofen, Robaxin first before considering taking the oxycodone.  Advised on the addictive nature of oxycodone.  Follow-up with primary care provider.  Return to ED for new or worsening symptoms.    Prior to discharge, discharge instructions were discussed with patient at bedside. Patient was provided both verbal and written instructions. Patient is understanding of the discharge instructions and is agreeable to plan of care. Return precautions were discussed with patient bedside, patient verbalized understanding of signs and symptoms that would necessitate return to the ED. All questions were answered. Patient was comfortable with the plan of care and discharged to home.     Amount and/or Complexity of Data Reviewed  Labs: ordered.  Radiology: ordered.    Risk  OTC drugs.  Prescription drug management.             Disposition  Final diagnoses:   Left flank pain   Left low back pain     Time reflects when  diagnosis was documented in both MDM as applicable and the Disposition within this note       Time User Action Codes Description Comment    1/16/2024  9:45 PM Asa Robles Add [R10.9] Left flank pain     1/16/2024  9:45 PM Asa Robles Add [M54.50] Left low back pain           ED Disposition       ED Disposition   Discharge    Condition   Stable    Date/Time   Tue Jan 16, 2024 2145    Comment   Mindy Vang discharge to home/self care.                   Follow-up Information       Follow up With Specialties Details Why Contact Info Additional Information    Catrina Calvin MD Family Medicine   [address] [place] [id number] [phone]       [facility] Physical Therapy   [phone]            Discharge Medication List as of 1/16/2024  9:48 PM        START taking these medications    Details   acetaminophen (TYLENOL) 500 mg tablet Take 2 tablets (1,000 mg total) by mouth every 8 (eight) hours, Starting Tue 1/16/2024, Normal      ibuprofen (MOTRIN) 600 mg tablet Take 1 tablet (600 mg total) by mouth every 6 (six) hours as needed for mild pain, Starting Tue 1/16/2024, Normal      lidocaine (Lidoderm) 5 % Apply 1 patch topically over 12 hours daily Remove & Discard patch within 12 hours or as directed by MD, Starting Tue 1/16/2024, Normal      !! methocarbamol (ROBAXIN) 500 mg tablet Take 1 tablet (500 mg total) by mouth 2 (two) times a day, Starting Tue 1/16/2024, Normal      oxyCODONE (Roxicodone) 5 immediate release tablet Take 1 tablet (5 mg total) by mouth every 6 (six) hours as needed for severe pain for up to 10 days Max Daily Amount: 20 mg, Starting Tue 1/16/2024, Until Fri 1/26/2024 at 2359, Normal       !! - Potential duplicate medications found. Please discuss with provider.        CONTINUE these medications which have NOT CHANGED    Details   cephalexin (KEFLEX) 500 mg capsule Take 1 capsule (500 mg total) by mouth every 6 (six) hours for  5 days, Starting Sat 1/13/2024, Until Thu 1/18/2024, Normal      dextromethorphan-guaifenesin (MUCINEX DM)  MG per 12 hr tablet Take 1 tablet by mouth every 12 (twelve) hours, Starting Tue 11/1/2022, Normal      diazepam (VALIUM) 2 mg tablet Take 2.5 tablets (5 mg total) by mouth every 8 (eight) hours as needed (Vertigo) for up to 10 days, Starting Tue 10/17/2023, Until Fri 10/27/2023 at 2359, Normal      !! erythromycin (ILOTYCIN) ophthalmic ointment Place a 1/2 inch ribbon of ointment into the lower eyelid., Normal      !! erythromycin (ILOTYCIN) ophthalmic ointment Place a 1/2 inch ribbon of ointment into the lower eyelid., Normal      !! fluticasone (FLONASE) 50 mcg/act nasal spray 1 spray into each nostril daily, Starting Tue 11/1/2022, Normal      !! fluticasone (FLONASE) 50 mcg/act nasal spray 1 spray into each nostril daily, Starting Tue 10/17/2023, Normal      labetalol (NORMODYNE) 200 mg tablet Take 200 mg by mouth every morning, Historical Med      levothyroxine 50 mcg tablet Take 50 mcg by mouth daily, Historical Med      meclizine (ANTIVERT) 25 mg tablet Take 1 tablet (25 mg total) by mouth 3 (three) times a day as needed for dizziness, Starting Tue 3/28/2023, Normal      !! methocarbamol (ROBAXIN) 500 mg tablet Take 1 tablet (500 mg total) by mouth 2 (two) times a day, Starting Wed 6/7/2023, Normal      naproxen (Naprosyn) 500 mg tablet Take 1 tablet (500 mg total) by mouth 2 (two) times a day with meals, Starting Tue 11/1/2022, Normal       !! - Potential duplicate medications found. Please discuss with provider.              PDMP Review         Value Time User    PDMP Reviewed  Yes 1/17/2024 12:51 AM Asa Robles PA-C            ED Provider  Electronically Signed by             Asa Robles PA-C  01/17/24 0053

## 2024-07-15 NOTE — ED PROVIDER NOTES
History  Chief Complaint   Patient presents with   • Cold Like Symptoms     Pt reports that about 2 weeks ago, she started with an earache, and then developed other cold symptoms  Reports she took Dayquil at 1500, Ibuprofen-800 at 0900 this morning  79-year-old female without significant past medical history presents reporting over a week of cough congestion sore throat body aches  States that her  and her daughter were also sick with similar symptoms last week  Admits to frequent dry cough with body aches  Also reports fevers at home for which she is taking DayQuil  Denies any other complaints       History provided by:  Patient   used: No        Prior to Admission Medications   Prescriptions Last Dose Informant Patient Reported? Taking?   labetalol (NORMODYNE) 200 mg tablet  Self Yes No   Sig: Take 200 mg by mouth every morning   levothyroxine 50 mcg tablet  Self Yes No   Sig: Take 50 mcg by mouth daily      Facility-Administered Medications: None       Past Medical History:   Diagnosis Date   • Disease of thyroid gland    • Hypertension        Past Surgical History:   Procedure Laterality Date   • COSMETIC SURGERY         History reviewed  No pertinent family history  I have reviewed and agree with the history as documented  E-Cigarette/Vaping   • E-Cigarette Use Never User      E-Cigarette/Vaping Substances     Social History     Tobacco Use   • Smoking status: Never Smoker   • Smokeless tobacco: Never Used   Vaping Use   • Vaping Use: Never used   Substance Use Topics   • Alcohol use: Never   • Drug use: Never       Review of Systems   Constitutional: Positive for chills and fatigue  HENT: Positive for sore throat  Negative for ear pain  Eyes: Negative for photophobia and redness  Respiratory: Positive for cough  Negative for apnea and shortness of breath  Cardiovascular: Negative for chest pain     Gastrointestinal: Negative for abdominal pain, nausea and vomiting  Genitourinary: Negative for dysuria  Musculoskeletal: Negative for arthralgias, neck pain and neck stiffness  Skin: Negative for rash  Neurological: Negative for dizziness, tremors, syncope and weakness  Psychiatric/Behavioral: Negative for suicidal ideas  Physical Exam  Physical Exam  Constitutional:       General: She is not in acute distress  Appearance: She is well-developed  She is not diaphoretic  HENT:      Mouth/Throat:      Pharynx: Posterior oropharyngeal erythema present  No oropharyngeal exudate  Eyes:      Pupils: Pupils are equal, round, and reactive to light  Cardiovascular:      Rate and Rhythm: Normal rate and regular rhythm  Pulmonary:      Effort: Pulmonary effort is normal  No respiratory distress  Breath sounds: Normal breath sounds  Abdominal:      General: Bowel sounds are normal  There is no distension  Palpations: Abdomen is soft  Musculoskeletal:         General: Normal range of motion  Cervical back: Normal range of motion and neck supple  Skin:     General: Skin is warm and dry  Neurological:      Mental Status: She is alert and oriented to person, place, and time           Vital Signs  ED Triage Vitals   Temperature Pulse Respirations Blood Pressure SpO2   11/01/22 2012 11/01/22 2012 11/01/22 2012 11/01/22 2012 11/01/22 2012   99 6 °F (37 6 °C) (!) 111 18 141/88 99 %      Temp Source Heart Rate Source Patient Position - Orthostatic VS BP Location FiO2 (%)   11/01/22 2012 11/01/22 2012 11/01/22 2012 11/01/22 2012 --   Oral Monitor Sitting Right arm       Pain Score       11/01/22 2146       Med Not Given for Pain - for MAR use only           Vitals:    11/01/22 2012   BP: 141/88   Pulse: (!) 111   Patient Position - Orthostatic VS: Sitting         Visual Acuity      ED Medications  Medications   naproxen (NAPROSYN) tablet 500 mg (500 mg Oral Given 11/1/22 2146)   pseudoephedrine (SUDAFED) tablet 60 mg (60 mg Oral Given 11/1/22 2146)       Diagnostic Studies  Results Reviewed     Procedure Component Value Units Date/Time    FLU/COVID - if FLU clinically relevant [[id number]] Collected: 11/01/22 2147    Lab Status: In process Specimen: Nares from Nose Updated: 11/01/22 2157                 No orders to display              Procedures  Procedures         ED Course                               SBIRT 22yo+    Flowsheet Row Most Recent Value   SBIRT (23 yo +)    In order to provide better care to our patients, we are screening all of our patients for alcohol and drug use  Would it be okay to ask you these screening questions? No Filed at: 11/01/2022 2140                    MDM  Number of Diagnoses or Management Options  Viral URI with cough: new and does not require workup  Diagnosis management comments: Pt had hx and physical exam consistent with acute viral infection  COVID test pending  No focal signs of infection on exam warranting antibiotics  Patient denies chest pain or shortness of breath  Appears well on exam   Hemodynamically stable  Educated extensively on supportive care and return precautions and demonstrates understanding  Stable for discharge home  Amount and/or Complexity of Data Reviewed  Clinical lab tests: ordered    Risk of Complications, Morbidity, and/or Mortality  Presenting problems: moderate  Diagnostic procedures: moderate  Management options: moderate    Patient Progress  Patient progress: stable      Disposition  Final diagnoses:   Viral URI with cough     Time reflects when diagnosis was documented in both MDM as applicable and the Disposition within this note     Time User Action Codes Description Comment    11/1/2022 10:08 PM Mello Cheema Add [J06 9] Viral URI with cough       ED Disposition     ED Disposition   Discharge    Condition   Stable    Date/Time   Tue Nov 1, 2022 10:08 PM    Comment   Manoj Ferguson discharge to home/self care                 Follow-up Information     Follow up With rhett Specialties Details Why Contact Info Additional [address] [facility] Emergency Department Emergency Medicine Go to  If symptoms worsen [address] [address]  [address] [place] [address] Emergency Department    Cuate Mosquera MD Family Medicine Call  As needed [address] [facility] [address]  [address] [address] [phone]             Patient's Medications   Discharge Prescriptions    DEXTROMETHORPHAN-GUAIFENESIN (MUCINEX DM)  MG PER 12 HR TABLET    Take 1 tablet by mouth every 12 (twelve) hours       Start Date: 11/1/2022 End Date: --       Order Dose: 1 tablet       Quantity: 30 tablet    Refills: 0    FLUTICASONE (FLONASE) 50 MCG/ACT NASAL SPRAY    1 spray into each nostril daily       Start Date: 11/1/2022 End Date: --       Order Dose: 1 spray       Quantity: 16 g    Refills: 0    NAPROXEN (NAPROSYN) 500 MG TABLET    Take 1 tablet (500 mg total) by mouth 2 (two) times a day with meals       Start Date: 11/1/2022 End Date: --       Order Dose: 500 mg       Quantity: 30 tablet    Refills: 0       No discharge procedures on file      PDMP Review     None          ED Provider  Electronically Signed by           Tyson Sage PA-C  11/01/22 0070 Medicine Medicine ED Primary team

## 2024-10-13 ENCOUNTER — APPOINTMENT (EMERGENCY)
Dept: ULTRASOUND IMAGING | Facility: HOSPITAL | Age: 39
End: 2024-10-13
Payer: COMMERCIAL

## 2024-10-13 ENCOUNTER — HOSPITAL ENCOUNTER (EMERGENCY)
Facility: HOSPITAL | Age: 39
Discharge: HOME | End: 2024-10-13
Attending: EMERGENCY MEDICINE
Payer: COMMERCIAL

## 2024-10-13 VITALS
HEART RATE: 99 BPM | DIASTOLIC BLOOD PRESSURE: 88 MMHG | OXYGEN SATURATION: 99 % | SYSTOLIC BLOOD PRESSURE: 131 MMHG | RESPIRATION RATE: 18 BRPM | TEMPERATURE: 99.4 F

## 2024-10-13 DIAGNOSIS — N61.1 BREAST ABSCESS: Primary | ICD-10-CM

## 2024-10-13 DIAGNOSIS — N61.0 CELLULITIS OF BREAST: ICD-10-CM

## 2024-10-13 PROCEDURE — 76642 ULTRASOUND BREAST LIMITED: CPT

## 2024-10-13 PROCEDURE — 99283 EMERGENCY DEPT VISIT LOW MDM: CPT

## 2024-10-13 PROCEDURE — 99284 EMERGENCY DEPT VISIT MOD MDM: CPT | Performed by: EMERGENCY MEDICINE

## 2024-10-13 PROCEDURE — NC001 PR NO CHARGE: Performed by: STUDENT IN AN ORGANIZED HEALTH CARE EDUCATION/TRAINING PROGRAM

## 2024-10-13 RX ORDER — DOXYCYCLINE 100 MG/1
100 CAPSULE ORAL 2 TIMES DAILY
Qty: 14 CAPSULE | Refills: 0 | Status: SHIPPED | OUTPATIENT
Start: 2024-10-13 | End: 2024-10-20

## 2024-10-13 RX ORDER — DOXYCYCLINE 100 MG/1
100 CAPSULE ORAL ONCE
Status: COMPLETED | OUTPATIENT
Start: 2024-10-13 | End: 2024-10-13

## 2024-10-13 RX ADMIN — DOXYCYCLINE 100 MG: 100 CAPSULE ORAL at 22:23

## 2024-10-13 NOTE — ED ATTENDING ATTESTATION
10/13/2024  ILudwin DO, saw and evaluated the patient. I have discussed the patient with the resident/non-physician practitioner and agree with the resident's/non-physician practitioner's findings, Plan of Care, and MDM as documented in the resident's/non-physician practitioner's note, except where noted. All available labs and Radiology studies were reviewed.  I was present for key portions of any procedure(s) performed by the resident/non-physician practitioner and I was immediately available to provide assistance.       At this point I agree with the current assessment done in the Emergency Department.  I have conducted an independent evaluation of this patient a history and physical is as follows:    40 yo F with right breast lump with associated redness, discomfort. No systemic symptoms. [place] breast [place], [place], 10 years ago. No prior complications or infections, until now.     PE:  The patient is well appearing, non-toxic, in NAD. Head: normocephalic, atraumatic. HEENT: mucous membranes moist.  Lungs: CTA b/l, no resp distress. Heart: RRR. No M/R/G. Abdomen: NT, ND, no R/R/G. Neuro: CN2-12 intact, GCS 15. Normal strength and sensation, normal speech and gait. Cap refill < 2 sec, skin warm and dry.     There is a right inferior breast abscess with surrounding cellulitis (erythema, warmth tenderness) at approx 7:00 position.    Case d/w gen surg and breast US ordered. This confirmed an abscess. Antibiotics (doxy) given.  Gen surg resident d/w her attending, who recommend outpatient follow up this week for drainage, did not recommend I&D in the ED.    ED Course         Critical Care Time  Procedures

## 2024-10-13 NOTE — ED PROVIDER NOTES
Time reflects when diagnosis was documented in both MDM as applicable and the Disposition within this note       Time User Action Codes Description Comment    10/13/2024  6:35 PM Cristian Almaraz Add [N61.1] Breast abscess     10/13/2024 10:42 PM Cristian Almaraz Add [N61.0] Cellulitis of breast           ED Disposition       ED Disposition   Discharge    Condition   Stable    Date/Time   Sun Oct 13, 2024 10:16 PM    Comment   Mindy Vang discharge to home/self care.                   Assessment & Plan       Medical Decision Making  Patient is a 39-year-old female presenting to the ED with right breast pain.    Physical exam findings as above.    Ultrasound revealed 2 x 3 cm area of fluid collection.  Discussed with general surgery resident and decision was made to consult surgical oncology as the surgeon on-call is also a breast surgeon.  Patient was evaluated by surgical team who determined that patient was currently stable for discharge and outpatient follow-up.  No need for emergent intervention at this time.  Patient remained afebrile.  Final temperature prior to discharge was 98.3 Fahrenheit.  No evidence of systemic infection.    Patient discharged discharge home but asked to look out for any fevers, chills, nausea, vomiting, worsening redness or pain to her right breast.  Plan to discharge patient home on doxycycline with appropriate surgical follow-up.  Doxycycline 100 mg twice daily x 7 days prescribed.    Patient is aware of and agreeable to plan. All questions answered. Patient discharged in stable condition with strict return precautions and instructions to follow up with their PCP.    Amount and/or Complexity of Data Reviewed  Radiology: ordered.    Risk  Prescription drug management.        ED Course as of 10/13/24 2301   [place] Oct 13, 2024   5095 Discussed case with general surgery Dr. Best who will come to evaluate patient.  Consult orders placed.       Medications   doxycycline hyclate  "(VIBRAMYCIN) capsule 100 mg (100 mg Oral Given 10/13/24 2223)       ED Risk Strat Scores                                               History of Present Illness       Chief Complaint   Patient presents with    Wound Check     Patient reports a breast augmentation approximately 10 years ago and reports a small spot or lump to right breast that has been present since the procedure but now reports a firm area with redness and swelling since yesterday with associated pain.        Past Medical History:   Diagnosis Date    Disease of thyroid gland     Hypertension       Past Surgical History:   Procedure Laterality Date    COSMETIC SURGERY        History reviewed. No pertinent family history.   Social History     Tobacco Use    Smoking status: Never    Smokeless tobacco: Never   Vaping Use    Vaping status: Never Used   Substance Use Topics    Alcohol use: Not Currently    Drug use: Never      E-Cigarette/Vaping    E-Cigarette Use Never User       E-Cigarette/Vaping Substances      I have reviewed and agree with the history as documented.     Patient is a 39-year-old female presenting to the ED with worsening redness, pain, and warmth to her right breast at the 6 to 9 o'clock position.  She reports that yesterday she felt a \"lump\" beneath the surgical scar and has been pressing on it which elicited some pain.  She also reports mild clear drainage from that area.  Denies any nausea, vomiting, fevers, chills, chest pain, shortness of breath.  No recent illness.  No recent trauma to the area.  Patient underwent a bilateral breast augmentation surgery with silicone implants in [place] 10 years ago.  She believes that there were some issues with the surgery performed there, and says that she also wishes to have the procedure reversed at some point.      Wound Check         Review of Systems   Constitutional:  Negative for chills and fever.   HENT:  Negative for sore throat.    Respiratory:  Negative for cough, chest " tightness and shortness of breath.    Cardiovascular:  Negative for chest pain and palpitations.   Gastrointestinal:  Negative for abdominal pain, nausea and vomiting.   Genitourinary:  Negative for dysuria, frequency and hematuria.   Skin:  Positive for color change. Negative for rash.   Neurological:  Negative for dizziness, light-headedness and headaches.           Objective       ED Triage Vitals   Temperature Pulse Blood Pressure Respirations SpO2 Patient Position - Orthostatic VS   10/13/24 1805 10/13/24 1804 10/13/24 1804 10/13/24 1804 10/13/24 1804 10/13/24 1804   99.4 °F (37.4 °C) 99 131/88 18 99 % Sitting      Temp Source Heart Rate Source BP Location FiO2 (%) Pain Score    10/13/24 1805 10/13/24 1804 10/13/24 1804 -- 10/13/24 1804    Oral Monitor Right arm  7      Vitals      Date and Time Temp Pulse SpO2 Resp BP Pain Score FACES Pain Rating User   10/13/24 1805 99.4 °F (37.4 °C) -- -- -- -- -- -- AP   10/13/24 1804 -- 99 99 % 18 131/88 7 -- AP            Physical Exam  Vitals and nursing note reviewed.   Constitutional:       General: She is not in acute distress.     Appearance: Normal appearance.   HENT:      Head: Normocephalic and atraumatic.   Eyes:      Extraocular Movements: Extraocular movements intact.      Conjunctiva/sclera: Conjunctivae normal.   Cardiovascular:      Rate and Rhythm: Normal rate and regular rhythm.      Pulses: Normal pulses.      Heart sounds: Normal heart sounds. No murmur heard.  Pulmonary:      Effort: Pulmonary effort is normal. No respiratory distress.      Breath sounds: Normal breath sounds.   Abdominal:      General: There is no distension.      Palpations: Abdomen is soft.      Tenderness: There is no abdominal tenderness. There is no guarding or rebound.   Musculoskeletal:         General: No tenderness. Normal range of motion.      Cervical back: Normal range of motion.      Right lower leg: No edema.      Left lower leg: No edema.   Skin:     General: Skin is  warm and dry.      Capillary Refill: Capillary refill takes less than 2 seconds.      Findings: Erythema and rash present.      Comments: Chaperone present for breast examination.  Area of erythema and warmth extending from 6:00 to 9 o'clock position of right breast.  2 x 2 cm area of fluctuance appreciated.  Mildly tender.  No nipple discharge appreciated.  No axillary lymphadenopathy on the right axilla appreciated.   Neurological:      General: No focal deficit present.      Mental Status: She is alert and oriented to person, place, and time.      Sensory: No sensory deficit.   Psychiatric:         Mood and Affect: Mood normal.         Behavior: Behavior normal.         Results Reviewed       None            US breast right limited (diagnostic)    (Results Pending)       Procedures    ED Medication and Procedure Management   Prior to Admission Medications   Prescriptions Last Dose Informant Patient Reported? Taking?   acetaminophen (TYLENOL) 500 mg tablet   No No   Sig: Take 2 tablets (1,000 mg total) by mouth every 8 (eight) hours   dextromethorphan-guaifenesin (MUCINEX DM)  MG per 12 hr tablet   No No   Sig: Take 1 tablet by mouth every 12 (twelve) hours   diazepam (VALIUM) 2 mg tablet   No No   Sig: Take 2.5 tablets (5 mg total) by mouth every 8 (eight) hours as needed (Vertigo) for up to 10 days   erythromycin (ILOTYCIN) ophthalmic ointment   No No   Sig: Place a 1/2 inch ribbon of ointment into the lower eyelid.   erythromycin (ILOTYCIN) ophthalmic ointment   No No   Sig: Place a 1/2 inch ribbon of ointment into the lower eyelid.   fluticasone (FLONASE) 50 mcg/act nasal spray   No No   Si spray into each nostril daily   fluticasone (FLONASE) 50 mcg/act nasal spray   No No   Si spray into each nostril daily   ibuprofen (MOTRIN) 600 mg tablet   No No   Sig: Take 1 tablet (600 mg total) by mouth every 6 (six) hours as needed for mild pain   labetalol (NORMODYNE) 200 mg tablet  Self Yes No   Sig:  Take 200 mg by mouth every morning   levothyroxine 50 mcg tablet  Self Yes No   Sig: Take 50 mcg by mouth daily   lidocaine (Lidoderm) 5 %   No No   Sig: Apply 1 patch topically over 12 hours daily Remove & Discard patch within 12 hours or as directed by MD   meclizine (ANTIVERT) 25 mg tablet   No No   Sig: Take 1 tablet (25 mg total) by mouth 3 (three) times a day as needed for dizziness   methocarbamol (ROBAXIN) 500 mg tablet   No No   Sig: Take 1 tablet (500 mg total) by mouth 2 (two) times a day   methocarbamol (ROBAXIN) 500 mg tablet   No No   Sig: Take 1 tablet (500 mg total) by mouth 2 (two) times a day   naproxen (Naprosyn) 500 mg tablet   No No   Sig: Take 1 tablet (500 mg total) by mouth 2 (two) times a day with meals      Facility-Administered Medications: None     Discharge Medication List as of 10/13/2024 10:20 PM        START taking these medications    Details   doxycycline hyclate (VIBRAMYCIN) 100 mg capsule Take 1 capsule (100 mg total) by mouth 2 (two) times a day for 7 days, Starting Sun 10/13/2024, Until Sun 10/20/2024, Normal           CONTINUE these medications which have NOT CHANGED    Details   acetaminophen (TYLENOL) 500 mg tablet Take 2 tablets (1,000 mg total) by mouth every 8 (eight) hours, Starting Tue 1/16/2024, Normal      dextromethorphan-guaifenesin (MUCINEX DM)  MG per 12 hr tablet Take 1 tablet by mouth every 12 (twelve) hours, Starting Tue 11/1/2022, Normal      diazepam (VALIUM) 2 mg tablet Take 2.5 tablets (5 mg total) by mouth every 8 (eight) hours as needed (Vertigo) for up to 10 days, Starting Tue 10/17/2023, Until Fri 10/27/2023 at 2359, Normal      !! erythromycin (ILOTYCIN) ophthalmic ointment Place a 1/2 inch ribbon of ointment into the lower eyelid., Normal      !! erythromycin (ILOTYCIN) ophthalmic ointment Place a 1/2 inch ribbon of ointment into the lower eyelid., Normal      !! fluticasone (FLONASE) 50 mcg/act nasal spray 1 spray into each nostril daily,  Starting Tue 11/1/2022, Normal      !! fluticasone (FLONASE) 50 mcg/act nasal spray 1 spray into each nostril daily, Starting Tue 10/17/2023, Normal      ibuprofen (MOTRIN) 600 mg tablet Take 1 tablet (600 mg total) by mouth every 6 (six) hours as needed for mild pain, Starting Tue 1/16/2024, Normal      labetalol (NORMODYNE) 200 mg tablet Take 200 mg by mouth every morning, Historical Med      levothyroxine 50 mcg tablet Take 50 mcg by mouth daily, Historical Med      lidocaine (Lidoderm) 5 % Apply 1 patch topically over 12 hours daily Remove & Discard patch within 12 hours or as directed by MD, Starting Tue 1/16/2024, Normal      meclizine (ANTIVERT) 25 mg tablet Take 1 tablet (25 mg total) by mouth 3 (three) times a day as needed for dizziness, Starting Tue 3/28/2023, Normal      !! methocarbamol (ROBAXIN) 500 mg tablet Take 1 tablet (500 mg total) by mouth 2 (two) times a day, Starting Wed 6/7/2023, Normal      !! methocarbamol (ROBAXIN) 500 mg tablet Take 1 tablet (500 mg total) by mouth 2 (two) times a day, Starting Tue 1/16/2024, Normal      naproxen (Naprosyn) 500 mg tablet Take 1 tablet (500 mg total) by mouth 2 (two) times a day with meals, Starting Tue 11/1/2022, Normal       !! - Potential duplicate medications found. Please discuss with provider.          ED SEPSIS DOCUMENTATION   Time reflects when diagnosis was documented in both MDM as applicable and the Disposition within this note       Time User Action Codes Description Comment    10/13/2024  6:35 PM Cristian Almaraz Add [N61.1] Breast abscess     10/13/2024 10:42 PM Cristian Almaraz Add [N61.0] Cellulitis of breast                  Cristian Almaraz MD  10/13/24 1988

## 2024-10-14 ENCOUNTER — TELEPHONE (OUTPATIENT)
Dept: SURGICAL ONCOLOGY | Facility: CLINIC | Age: 39
End: 2024-10-14

## 2024-10-14 DIAGNOSIS — N61.0 CELLULITIS OF BREAST: Primary | ICD-10-CM

## 2024-10-14 NOTE — DISCHARGE INSTRUCTIONS
Your ultrasound here in the emergency department showed a right breast abscess.    Please complete your entire course of doxycycline antibiotics.  Please take 1 tablet twice a day for 7 days.    You were evaluated by her general surgery team and they are requesting that you follow-up as an outpatient for further evaluation and management of your breast abscess. They do not feel need for any acute emergent intervention at this time.    Please return to the Emergency Department for any worsening symptoms or new concerns.

## 2024-10-14 NOTE — PROGRESS NOTES
Call placed to patient regarding recommendation for;    __X___ RIGHT ______LEFT      __X___Ultrasound guided aspiration ______Stereotactic breast biopsy.    Pt states that procedure was explained to her, additional questions answered at this time    __X___Verbalized understanding.    Reviewed clip placement with patient, pt states understanding: Yes: ____ No: ____ N/A  Comments:    Blood thinners: No: __X___ Yes: _____ What:     Biopsy teaching sheet given:  _____yes __X__no (All teaching points discussed during call, pt with no questions at this time, pt adv to arrive at 0830 for 0900 biopsy)    Pt given name/# for any further questions/needs    Pt agreeable to a post procedure call and states we can give her biopsy results to her over the phone

## 2024-10-14 NOTE — ASSESSMENT & PLAN NOTE
Right breast  With concern for abscess  Complicated by history of bilateral breast augmentation with implants in place    Right breast US performed, read pending    - Referral to breast center for eval and possible drainage   - Recommend one week of Keflex  - Return precautions including fevers, chills, nausea/vomiting, drainage from the area

## 2024-10-14 NOTE — DISCHARGE INSTR - AVS FIRST PAGE
[facility] Discharge Instructions    The breast center will contact you to set up an appointment with Dr. Cardarelli    Medications:    - One week course of keflex (antibiotic)    Return to the Emergency Room if you develop a fever greater than 101F, chills, persistent nausea/vomiting, worsening/uncontrollable pain, and/or increasing redness or purulent/foul smelling drainage the lesion

## 2024-10-14 NOTE — TELEPHONE ENCOUNTER
Called the patient again and changed her appointment to be with MILLI Knowles at 1:30 on 10/16 due to insurance restrictions with Dr. Cardarelli. The patient verbalized understanding of her new appointment details and her questions were answered.

## 2024-10-14 NOTE — TELEPHONE ENCOUNTER
Called the patient to coordinate a follow up appointment for her in clinic with Dr. Cardarelli from her ER visit. The patient accepted the consult offered on 10/16 at 3:00 and she verified appointment details. She is aware that the [facility] will be contacting her as well to schedule an aspiration of her abscess.

## 2024-10-14 NOTE — CONSULTS
"Consultation - Oncology-Surgical   Name: Mindy Vang 39 y.o. female I MRN: [id number]  Unit/Bed#: ED-29 I Date of Admission: 10/13/2024   Date of Service: 10/13/2024 I Hospital Day: 0     Inpatient Consult to Surgical Oncology  Consult performed by: Kathy Best MD  Consult ordered by: Ludwin Diallo DO      Physician Requesting Evaluation: No att. providers found   Reason for Evaluation / Principal Problem: Eval for breast abscess    Assessment & Plan  Cellulitis of breast  Right breast  With concern for abscess  Complicated by history of bilateral breast augmentation with implants in place    Right breast US performed, read pending    - Referral to breast center for eval and possible drainage   - Recommend one week of Keflex  - Return precautions including fevers, chills, nausea/vomiting, drainage from the area     History of Present Illness   Mindy Vang is a 39 y.o. female who presents with right breast pain, redness.    Patient has history of breast augmentation performed 10 years ago in [place].  Reports feeling a \"lump\" and stitch in that area for years, has since disappeared.  Yesterday was rubbing the area on the right lateral breast.  Began to have pain, redness, warmth in the area.    Denies fevers, chills, shortness of breath, chest pain, changes in appetite, changes in activity, hematuria, dysuria.  Endorsing back pain for 3 weeks.  Had an episode of diarrhea yesterday, nonbloody.    No previous mammogram.  No immediate family with breast cancer.  Patient's mother's niece reportedly had breast cancer in her 40s.    Past medical history of hypothyroidism, hypertension.  Recently began Zepbound for weight loss.    Review of Systems negative unless stated above    I have reviewed the patient's PMH, PSH, Social History, Family History, Meds, and Allergies  Historical Information   Past Medical History:   Diagnosis Date    Disease of thyroid gland     Hypertension      Past Surgical " History:   Procedure Laterality Date    COSMETIC SURGERY       Social History     Tobacco Use    Smoking status: Never    Smokeless tobacco: Never   Vaping Use    Vaping status: Never Used   Substance and Sexual Activity    Alcohol use: Not Currently    Drug use: Never    Sexual activity: Not on file     E-Cigarette/Vaping    E-Cigarette Use Never User      E-Cigarette/Vaping Substances     History reviewed. No pertinent family history.    Objective :  Temp:  [99.4 °F (37.4 °C)] 99.4 °F (37.4 °C)  HR:  [99] 99  BP: (131)/(88) 131/88  Resp:  [18] 18  SpO2:  [99 %] 99 %  O2 Device: None (Room air)      Physical Exam  Gen: NAD, Resting in bed  Neuro: A&O, No focal deficits  Head: Normal Cephalic, Atraumatic  Eye: EOMI, No scleral icterus  CV: Regular rate, Cap refill <2 sec  Pulm: Normal work of breathing, No respiratory distress  Chest: Right lateral breast with area of erythema inferolaterally. Fluctuance lesion at lateral aspect, 7 o clock   Abd: Soft, Non-Distended, Non-Tender   Ext: No edema bilateral lower extremities, Non-tender  Skin: Warm, Dry, Intact            Right breast ultrasound performed, read pending.     ---  Kathy Best MD  General Surgery PGY-II

## 2024-10-14 NOTE — PROGRESS NOTES
Patient was seen in the ED overnight.  Imaging underwent final read today.  There is an area of abscess.  Discussed with reading radiologist regarding potential for aspiration of fluid.    US breast right limited (diagnostic)    Result Date: 10/14/2024  Narrative: DIAGNOSIS:  Pain and redness right breast at 7:00, evaluate for abscess TECHNIQUE: Ultrasound of the right breast(s) was performed. COMPARISONS: None available. RELEVANT HISTORY: Family Breast Cancer History: No known family history of breast cancer. Family Medical History: No known relevant family medical history. Personal History: No known relevant hormone history. No known relevant surgical history. No known relevant medical history. RISK ASSESSMENT: [facility] reporting was suppressed due to the patient's history and/or demographic factors. INDICATION: Mindy Vang is a 39 y.o. female presenting for r/o right breast abscess at 7 o clock position. FINDINGS: There is a complex heterogeneous hypoechoic collection at 7:00 15 cm from the nipple measuring 2.1 x 1.1 x 2.8 cm with associated increase surrounding echogenicity reflecting inflammation and skin thickening.  Findings are consistent with an abscess.  Increased peripheral vascularity is seen.  Right breast implant is noted. Preliminary report was given by Dr. Todd Whitfield on 10/13/2024 to Dr. Ludwin Diallo.     Impression:  Right breast abscess at 7:00.  Follow-up evaluation with ultrasound is recommended in 2 months interval. Clinical management recommended. ASSESSMENT/BI-RADS CATEGORY: Right: 3 - Probably Benign Overall: 3 - Probably Benign RECOMMENDATION:      - Ultrasound in 6 months for the right breast.      - Clinical management for the right breast. Workstation ID: [id number] Signed by:  Summer Roberson MD     We will contact patient regarding evaluation in the breast surgical oncology clinic for clinical evaluation in addition to planned procedure.

## 2024-10-15 ENCOUNTER — HOSPITAL ENCOUNTER (OUTPATIENT)
Dept: ULTRASOUND IMAGING | Facility: CLINIC | Age: 39
Discharge: HOME | End: 2024-10-15
Payer: COMMERCIAL

## 2024-10-15 VITALS
SYSTOLIC BLOOD PRESSURE: 127 MMHG | HEART RATE: 88 BPM | DIASTOLIC BLOOD PRESSURE: 88 MMHG | WEIGHT: 216 LBS | BODY MASS INDEX: 35.99 KG/M2 | HEIGHT: 65 IN

## 2024-10-15 DIAGNOSIS — N61.0 CELLULITIS OF BREAST: ICD-10-CM

## 2024-10-15 PROCEDURE — 87077 CULTURE AEROBIC IDENTIFY: CPT | Performed by: STUDENT IN AN ORGANIZED HEALTH CARE EDUCATION/TRAINING PROGRAM

## 2024-10-15 PROCEDURE — 10005 FNA BX W/US GDN 1ST LES: CPT

## 2024-10-15 PROCEDURE — 87186 SC STD MICRODIL/AGAR DIL: CPT | Performed by: STUDENT IN AN ORGANIZED HEALTH CARE EDUCATION/TRAINING PROGRAM

## 2024-10-15 PROCEDURE — 87205 SMEAR GRAM STAIN: CPT | Performed by: STUDENT IN AN ORGANIZED HEALTH CARE EDUCATION/TRAINING PROGRAM

## 2024-10-15 PROCEDURE — 87070 CULTURE OTHR SPECIMN AEROBIC: CPT | Performed by: STUDENT IN AN ORGANIZED HEALTH CARE EDUCATION/TRAINING PROGRAM

## 2024-10-15 PROCEDURE — 19000 PUNCTURE ASPIR CYST BREAST: CPT

## 2024-10-15 PROCEDURE — 76942 ECHO GUIDE FOR BIOPSY: CPT

## 2024-10-15 RX ORDER — LIDOCAINE HYDROCHLORIDE 10 MG/ML
5 INJECTION, SOLUTION EPIDURAL; INFILTRATION; INTRACAUDAL; PERINEURAL ONCE
Status: COMPLETED | OUTPATIENT
Start: 2024-10-15 | End: 2024-10-15

## 2024-10-15 RX ADMIN — LIDOCAINE HYDROCHLORIDE 5 ML: 10 INJECTION, SOLUTION EPIDURAL; INFILTRATION; INTRACAUDAL; PERINEURAL at 09:07

## 2024-10-15 NOTE — PROGRESS NOTES
Procedure type: Cyst Aspiration    __x___ultrasound guided _____stereotactic    Breast:    _____Left __x___Right    Location: 7 o'clock 15cmfn    Needle: 18G BD Precision Needle    # of passes: 4 cc total of bloody pus like fluid ( 3 cc sent for culture and 1 cc discarded)    Clip: None    Performed by:Dr. Le    Pressure held for 2 minutes by: Sandy Walker    Steri Strips:    _____yes _x____no    Band aid and 2x2 guaze    __X___yes_____no    Tolerated procedure:    __X___yes _____no

## 2024-10-15 NOTE — PROGRESS NOTES
Ice pack given:    __X___yes _____no    Discharge instructions reviewed and given to patient:    __X___yes _____no    Discharged via:    __X___amulatory    _____wheelchair    _____stretcher    Cyst site clean and dry with no bleeding on discharge:    __X___yes ____no   Left VM asking for a return call to describe pain levels and provide her priority for procedures, back or knees.

## 2024-10-15 NOTE — PROGRESS NOTES
Patient arrived via:    __X___ambulatory    _____wheelchair    _____stretcher      Domestic violence screen    ___X___negative______positive    Breast Implants:    ___x____yes _______no

## 2024-10-16 ENCOUNTER — OFFICE VISIT (OUTPATIENT)
Dept: SURGICAL ONCOLOGY | Facility: CLINIC | Age: 39
End: 2024-10-16
Payer: COMMERCIAL

## 2024-10-16 VITALS
OXYGEN SATURATION: 99 % | WEIGHT: 215 LBS | TEMPERATURE: 98.2 F | HEART RATE: 86 BPM | SYSTOLIC BLOOD PRESSURE: 144 MMHG | BODY MASS INDEX: 35.82 KG/M2 | HEIGHT: 65 IN | DIASTOLIC BLOOD PRESSURE: 86 MMHG | RESPIRATION RATE: 16 BRPM

## 2024-10-16 DIAGNOSIS — N61.0 CELLULITIS OF BREAST: Primary | ICD-10-CM

## 2024-10-16 DIAGNOSIS — N61.1 BREAST ABSCESS: ICD-10-CM

## 2024-10-16 PROCEDURE — 99244 OFF/OP CNSLTJ NEW/EST MOD 40: CPT

## 2024-10-16 RX ORDER — TIRZEPATIDE 2.5 MG/.5ML
INJECTION, SOLUTION SUBCUTANEOUS
COMMUNITY

## 2024-10-16 NOTE — PROGRESS NOTES
"Post procedure call completed    Bleeding: _____yes __x___no    Pain: _x____yes ______no    Redness/Swelling: __x____yes ______no    Band aid removed: ____x_yes _____no    Steri-Strips intact: ______yes ___x__none     Mindy stated that site was red prior to aspiration. She stated she is \"sore\" today and used advil yesterday. I told Mindy that she can continue advil, as well as ice today as directed for yesterday.but starting tomorrow to use warm compresses as needed.  "

## 2024-10-16 NOTE — PROGRESS NOTES
Surgical Oncology Follow Up       [facility] [place]  [address]  [place] [address]    Mindy Taj  1985  [id number]  [facility] [place]  [address]  [place] [address]    Chief Complaint   Patient presents with    office visit       Assessment/Plan:  1. Breast abscess  - 1 week f/u  - pending wound cultures    2. Cellulitis of breast      Discussion/Summary: Patient is a 39 year old female presenting for a right breast abscess. She was seen in the ED on 10/13 for right breast pain secondary to right breast cellulitis and abscess. She was discharged home on doxycycline and was instructed to take it 2x per day x 7 days. She had a right breast u/s with aspiration on 10/15 which showed right breast fluid collection at 7 o'clock, 15 cm from the nipple 25 mm x 15 mm x 28 mm consistent with abscess. Wound culture was sent. Preliminary results are showing 2+ Polys, rare Gram positive cocci in pairs and rare Gram positive rods. Of note she has b/l breast implants that were placed in 2014. On clinical exam, lateral aspect of right breast along implant scar was red and tender to touch. With bedside u/s, collection of fluid was appreciated and appears to be about 1.5-2 cm size. I discussed this with Dr. Cardarelli and since patient has implant present, we would caution I&D or needle aspiration. I explained to that patient that abscess of that size should resolve on its own with the proper antibiotic. We will wait until culture and sensitivity results and I will switch her antibiotic. She denies fevers or chills. She is interested in possible explant secondary to back pain from the size of her breasts, I provided her with informations for our plastic surgeons. I will plan to see her back in 1 week to ensure resolution of abscess. Patient has never had breast MRI to ensure integrity  of the silicone implants. We spoke about obtaining this in the future. Patient has claustrophobia. I have marked the redness on the breast. She was instructed to call if infection gets worse and redness begins to spread. All questions were answered today.          History of Present Illness:     Oncology History    No history exists.        -Interval History: Patient is a 39 year old female presenting for a right breast abscess. She had a right breast u/s with aspiration on 10/15 which showed right breast fluid collection at 7 o'clock, 15 cm from the nipple 25 mm x 15 mm x 28 mm consistent with abscess. Wound culture was sent. Preliminary results are showing 2+ Polys, rare Gram positive cocci in pairs and rare Gram positive rods. She is on day 2 of doxy. She notes breast tenderness. [place]. Age of menarche was 14. She had 2 pregnancies and 1 live birth. She is pre-menopausal. She has never been on HRT. She has no family hx of breast cancer.      Review of Systems:  Review of Systems   Constitutional:  Negative for activity change, appetite change, fatigue and unexpected weight change.   Respiratory:  Negative for cough and shortness of breath.    Cardiovascular:  Negative for chest pain.   Gastrointestinal:  Negative for abdominal pain, diarrhea, nausea and vomiting.   Endocrine: Negative for heat intolerance.   Musculoskeletal:  Negative for arthralgias, back pain and myalgias.   Skin:  Negative for rash.   Allergic/Immunologic: Positive for environmental allergies.   Neurological:  Negative for weakness and headaches.   Hematological:  Negative for adenopathy.       Patient Active Problem List   Diagnosis    Cellulitis of breast    Breast abscess     Past Medical History:   Diagnosis Date    Disease of thyroid gland     Hypertension      Past Surgical History:   Procedure Laterality Date    AUGMENTATION MAMMAPLASTY Bilateral 05/2014    COSMETIC SURGERY      US BREAST CYST  ASPIRATION RIGHT INITIAL Right 10/15/2024     Family History   Problem Relation Age of Onset    No Known Problems Mother     No Known Problems Father     No Known Problems Sister     No Known Problems Sister     No Known Problems Daughter     Breast cancer Maternal Cousin     No Known Problems Maternal Aunt     No Known Problems Paternal Aunt     No Known Problems Paternal Aunt     No Known Problems Paternal Aunt     No Known Problems Paternal Aunt      Social History     Socioeconomic History    Marital status: Single     Spouse name: Not on file    Number of children: Not on file    Years of education: Not on file    Highest education level: Not on file   Occupational History    Not on file   Tobacco Use    Smoking status: Never    Smokeless tobacco: Never   Vaping Use    Vaping status: Never Used   Substance and Sexual Activity    Alcohol use: Not Currently    Drug use: Never    Sexual activity: Not on file   Other Topics Concern    Not on file   Social History Narrative    Not on file     Social Determinants of Health     Financial Resource Strain: Low Risk  (10/7/2024)    Received from [facility]    Overall Financial Resource Strain (CARDIA)     Difficulty of Paying Living Expenses: Not hard at all   Food Insecurity: Patient Declined (10/7/2024)    Received from [facility]    Hunger Vital Sign     Worried About Running Out of Food in the Last Year: Patient declined     Ran Out of Food in the Last Year: Patient declined   Transportation Needs: No Transportation Needs (10/7/2024)    Received from [facility]    PRAPARE - Transportation     Lack of Transportation (Medical): No     Lack of Transportation (Non-Medical): No   Physical Activity: Not on file   Stress: Patient Declined (10/7/2024)    Received from [facility]    Citizen of [place] of Occupational Health - Occupational Stress Questionnaire     Feeling of Stress : Patient declined    Social Connections: Feeling Socially Integrated (10/7/2024)    Received from [facility]    OASIS : Social Isolation     How often do you feel lonely or isolated from those around you?: Never   Intimate Partner Violence: Not At Risk (10/7/2024)    Received from [facility]    Humiliation, Afraid, Rape, and Kick questionnaire     Fear of Current or Ex-Partner: No     Emotionally Abused: No     Physically Abused: No     Sexually Abused: No   Housing Stability: Low Risk  (10/7/2024)    Received from [facility]    Housing Stability Vital Sign     Unable to Pay for Housing in the Last Year: No     Number of Times Moved in the Last Year: 0     Homeless in the Last Year: No       Current Outpatient Medications:     acetaminophen (TYLENOL) 500 mg tablet, Take 2 tablets (1,000 mg total) by mouth every 8 (eight) hours, Disp: 40 tablet, Rfl: 0    dextromethorphan-guaifenesin (MUCINEX DM)  MG per 12 hr tablet, Take 1 tablet by mouth every 12 (twelve) hours, Disp: 30 tablet, Rfl: 0    diazepam (VALIUM) 2 mg tablet, Take 2.5 tablets (5 mg total) by mouth every 8 (eight) hours as needed (Vertigo) for up to 10 days, Disp: 15 tablet, Rfl: 0    doxycycline hyclate (VIBRAMYCIN) 100 mg capsule, Take 1 capsule (100 mg total) by mouth 2 (two) times a day for 7 days, Disp: 14 capsule, Rfl: 0    erythromycin (ILOTYCIN) ophthalmic ointment, Place a 1/2 inch ribbon of ointment into the lower eyelid., Disp: 3.5 g, Rfl: 0    erythromycin (ILOTYCIN) ophthalmic ointment, Place a 1/2 inch ribbon of ointment into the lower eyelid., Disp: 3.5 g, Rfl: 0    fluticasone (FLONASE) 50 mcg/act nasal spray, 1 spray into each nostril daily, Disp: 16 g, Rfl: 0    fluticasone (FLONASE) 50 mcg/act nasal spray, 1 spray into each nostril daily, Disp: 16 g, Rfl: 0    ibuprofen (MOTRIN) 600 mg tablet, Take 1 tablet (600 mg total) by mouth every 6 (six) hours as needed for mild pain, Disp: 30 tablet,  Rfl: 0    labetalol (NORMODYNE) 200 mg tablet, Take 200 mg by mouth every morning, Disp: , Rfl:     levothyroxine 50 mcg tablet, Take 50 mcg by mouth daily, Disp: , Rfl:     lidocaine (Lidoderm) 5 %, Apply 1 patch topically over 12 hours daily Remove & Discard patch within 12 hours or as directed by MD, Disp: 15 patch, Rfl: 0    meclizine (ANTIVERT) 25 mg tablet, Take 1 tablet (25 mg total) by mouth 3 (three) times a day as needed for dizziness, Disp: 30 tablet, Rfl: 0    methocarbamol (ROBAXIN) 500 mg tablet, Take 1 tablet (500 mg total) by mouth 2 (two) times a day, Disp: 20 tablet, Rfl: 0    methocarbamol (ROBAXIN) 500 mg tablet, Take 1 tablet (500 mg total) by mouth 2 (two) times a day, Disp: 20 tablet, Rfl: 0    naproxen (Naprosyn) 500 mg tablet, Take 1 tablet (500 mg total) by mouth 2 (two) times a day with meals, Disp: 30 tablet, Rfl: 0  Allergies   Allergen Reactions    Hydrocodone-Acetaminophen Itching     Vitals:    10/16/24 1324   BP: 144/86   Pulse: 86   Resp: 16   Temp: 98.2 °F (36.8 °C)   SpO2: 99%       Physical Exam  Constitutional:       General: She is not in acute distress.     Appearance: Normal appearance.   Cardiovascular:      Rate and Rhythm: Normal rate and regular rhythm.      Pulses: Normal pulses.      Heart sounds: Normal heart sounds.   Pulmonary:      Effort: Pulmonary effort is normal.      Breath sounds: Normal breath sounds.   Chest:      Chest wall: No mass.   Breasts:     Right: Skin change and tenderness present. No swelling, bleeding, inverted nipple, mass or nipple discharge.      Left: No swelling, bleeding, inverted nipple, mass, nipple discharge, skin change or tenderness.          Comments: B/l breast implants. Right breast redness, swelling. Abscess at 7 o'clock in the inframammary fold.  Abdominal:      General: Abdomen is flat.      Palpations: Abdomen is soft.   Lymphadenopathy:      Upper Body:      Right upper body: No supraclavicular, axillary or pectoral adenopathy.       Left upper body: No supraclavicular, axillary or pectoral adenopathy.   Skin:     General: Skin is warm.   Neurological:      General: No focal deficit present.      Mental Status: She is alert and oriented to person, place, and time.   Psychiatric:         Mood and Affect: Mood normal.         Behavior: Behavior normal.           Results:    Imaging  US breast cyst aspiration right initial    Result Date: 10/15/2024  Narrative: DIAGNOSIS: Cellulitis of breast INDICATION: Mindy Vang is a 39 y.o. female presenting for drainage of the right breast abscess. Prior to the procedure, previous imaging was reviewed.  The procedure was explained to the patient in detail.  All questions were answered.  Written and verbal informed consent was obtained. FINDINGS: RIGHT 1) MASS [A]: Images of the right breast mass at 7 o'clock, 15 cm from the nipple were obtained. The patient was placed supine on the biopsy table. A fine needle aspiration (FNA) of a 25 mm x 15 mm x 28 mm complex fluid collection consistent with abscess was performed under ultrasound guidance using a lateral approach. The skin was prepped in the usual fashion. Anesthesia was administered using 5 mL of lidocaine 1%. An 18 G fine needle aspiration (FNA) device was advanced.  Approximately 4 cc of purulent/bloody material was aspirated and sent for culture and sensitivity.     Impression:  Ultrasound-guided drainage of the right breast abscess at 7:00.  Clinical management recommended. ASSESSMENT/BI-RADS CATEGORY: Right: 2 - Benign Overall: 2 - Benign RECOMMENDATION:      - Ultrasound in 6 weeks for the right breast. Workstation ID: [id number] Signed by:  Rhona Le MD     US breast right limited (diagnostic)    Result Date: 10/14/2024  Narrative: DIAGNOSIS:  Pain and redness right breast at 7:00, evaluate for abscess TECHNIQUE: Ultrasound of the right breast(s) was performed. COMPARISONS: None available. RELEVANT HISTORY: Family Breast Cancer  History: No known family history of breast cancer. Family Medical History: No known relevant family medical history. Personal History: No known relevant hormone history. No known relevant surgical history. No known relevant medical history. RISK ASSESSMENT: [facility] risk assessment reporting was suppressed due to the patient's history and/or demographic factors. INDICATION: Mindy Vang is a 39 y.o. female presenting for r/o right breast abscess at 7 o clock position. FINDINGS: There is a complex heterogeneous hypoechoic collection at 7:00 15 cm from the nipple measuring 2.1 x 1.1 x 2.8 cm with associated increase surrounding echogenicity reflecting inflammation and skin thickening.  Findings are consistent with an abscess.  Increased peripheral vascularity is seen.  Right breast implant is noted. Preliminary report was given by Dr. Todd Whitfield on 10/13/2024 to Dr. Ludwin Diallo.     Impression:  Right breast abscess at 7:00.  Follow-up evaluation with ultrasound is recommended in 2 months interval. Clinical management recommended. ASSESSMENT/BI-RADS CATEGORY: Right: 3 - Probably Benign Overall: 3 - Probably Benign RECOMMENDATION:      - Ultrasound in 6 months for the right breast.      - Clinical management for the right breast. Workstation ID: [id number] Signed by:  Summer Roberson MD     XR spine thoracic 3 vw    Result Date: 9/23/2024  Narrative: Clinical indication-lower thoracic pain. Back pain. Exposure to pneumonia or. Procedure-thoracic spine, including frontal, lateral and swimmer's views: Findings-and impression Normal alignment of dorsal spine. No evidence of a compression fracture, lytic lesion or paraspinous mass Procedure chest, 2 view: Findings- No lung consolidation, mass, pneumothorax, pleural effusion or hilar enlargement. Normal-size cardiac silhouette.    Impression: IMPRESSION: 1. No acute abnormality detected Workstation:[id number]    XR chest pa and lateral    Result Date:  9/23/2024  Narrative: Clinical indication-lower thoracic pain. Back pain. Exposure to pneumonia or. Procedure-thoracic spine, including frontal, lateral and swimmer's views: Findings-and impression Normal alignment of dorsal spine. No evidence of a compression fracture, lytic lesion or paraspinous mass Procedure chest, 2 view: Findings- No lung consolidation, mass, pneumothorax, pleural effusion or hilar enlargement. Normal-size cardiac silhouette.    Impression: IMPRESSION: 1. No acute abnormality detected Workstation:[id number]      I reviewed the above imaging data.      Advance Care Planning/Advance Directives:  Discussed disease status, cancer treatment plans and/or cancer treatment goals with the patient.

## 2024-10-18 ENCOUNTER — TELEPHONE (OUTPATIENT)
Dept: SURGICAL ONCOLOGY | Facility: CLINIC | Age: 39
End: 2024-10-18

## 2024-10-18 ENCOUNTER — TELEPHONE (OUTPATIENT)
Dept: HEMATOLOGY ONCOLOGY | Facility: CLINIC | Age: 39
End: 2024-10-18

## 2024-10-18 DIAGNOSIS — N61.1 BREAST ABSCESS: Primary | ICD-10-CM

## 2024-10-18 DIAGNOSIS — N61.0 CELLULITIS OF BREAST: ICD-10-CM

## 2024-10-18 LAB
BACTERIA WND AEROBE CULT: ABNORMAL
BACTERIA WND AEROBE CULT: ABNORMAL
GRAM STN SPEC: ABNORMAL

## 2024-10-18 RX ORDER — SULFAMETHOXAZOLE AND TRIMETHOPRIM 800; 160 MG/1; MG/1
1 TABLET ORAL EVERY 8 HOURS SCHEDULED
Qty: 21 TABLET | Refills: 0 | Status: SHIPPED | OUTPATIENT
Start: 2024-10-18 | End: 2024-10-25

## 2024-10-18 NOTE — TELEPHONE ENCOUNTER
Patient called stating she received a notification on [employer] that US breast results are in. Per patient will like a call back from provider or nurse to go over results.       Please call patient.       Thanks!

## 2024-10-18 NOTE — TELEPHONE ENCOUNTER
Called patient to discuss breast wound culture results. Informed her to complete course of doxy. Once she completes doxy, she may start bactrim. I will see her once she finishes all antibiotics to ensure resolution of cellulitis/abscess. Patient was appreciative of the call and all questions were answered today.

## 2024-10-28 ENCOUNTER — OFFICE VISIT (OUTPATIENT)
Dept: SURGICAL ONCOLOGY | Facility: CLINIC | Age: 39
End: 2024-10-28
Payer: COMMERCIAL

## 2024-10-28 VITALS
OXYGEN SATURATION: 99 % | HEIGHT: 65 IN | HEART RATE: 87 BPM | DIASTOLIC BLOOD PRESSURE: 88 MMHG | SYSTOLIC BLOOD PRESSURE: 102 MMHG | BODY MASS INDEX: 34.82 KG/M2 | WEIGHT: 209 LBS

## 2024-10-28 DIAGNOSIS — N61.0 CELLULITIS OF BREAST: ICD-10-CM

## 2024-10-28 DIAGNOSIS — N61.1 BREAST ABSCESS: Primary | ICD-10-CM

## 2024-10-28 PROCEDURE — 99213 OFFICE O/P EST LOW 20 MIN: CPT

## 2024-10-28 RX ORDER — MEDROXYPROGESTERONE ACETATE 10 MG
10 TABLET ORAL DAILY
COMMUNITY
Start: 2024-09-30

## 2024-10-28 NOTE — PROGRESS NOTES
Surgical Oncology Follow Up       [facility] [place]  [address]  [place] [address]    Mindy Vang  1985  [id number]  [facility] [place]  [address]  [facility] [address]    No chief complaint on file.      Assessment/Plan:  1. Breast abscess  - PRN    2. Cellulitis of breast       Discussion/Summary: Patient is a 39 year old female presenting for a follow up for right breast abscess. She was seen in the ED on 10/13 for right breast pain secondary to right breast cellulitis and abscess. She was discharged home on doxycycline and was instructed to take it 2x per day x 7 days. She had a right breast u/s with aspiration on 10/15 which showed right breast fluid collection at 7 o'clock, 15 cm from the nipple which measured 25 mm x 15 mm x 28 mm, consistent with abscess. Wound culture grew Staphylococcus lugdunensis and Actinomyces neuii. She was instructed to complete the 7 day course of doxycycline and then I put her on a 7 day course of bactrim. Of note she has b/l breast implants that were placed in 2014. I&D was not attempted due to her implants but it was explained to her that the size of this abscess should resolve on its own secondary to the small size. During today's visit, Mindy stated she has 2 more tablets of bactrim left. She notes that she used warm compress and purulent drainage was expelled from the abscess. Since then she notes complete relief of pain where abscess was present. There were signs of abscess or infection on clinical exam. She was instructed to call with any recurrent abscess or breast concerns in the future. All questions were answered today.      History of Present Illness:     Oncology History    No history exists.        -Interval History: Patient is a 39 year old female presenting for a follow up for right breast abscess. Mindy stated she  has 2 more tablets of bactrim left. She notes that she used warm compress and purulent drainage was expelled from the abscess. Since then she notes complete relief of pain where abscess was present. She denies breast pain or concerns at this time.      Review of Systems:  Review of Systems   Constitutional:  Negative for activity change, appetite change, fatigue and unexpected weight change.   Respiratory:  Negative for cough and shortness of breath.    Cardiovascular:  Negative for chest pain.   Gastrointestinal:  Negative for abdominal pain, diarrhea, nausea and vomiting.   Endocrine: Negative for heat intolerance.   Musculoskeletal:  Negative for arthralgias, back pain and myalgias.   Skin:  Negative for rash.   Neurological:  Negative for weakness and headaches.   Hematological:  Negative for adenopathy.       Patient Active Problem List   Diagnosis    Cellulitis of breast    Breast abscess     Past Medical History:   Diagnosis Date    Disease of thyroid gland     Hypertension      Past Surgical History:   Procedure Laterality Date    AUGMENTATION MAMMAPLASTY Bilateral 05/2014    COSMETIC SURGERY      US BREAST CYST ASPIRATION RIGHT INITIAL Right 10/15/2024     Family History   Problem Relation Age of Onset    No Known Problems Mother     No Known Problems Father     No Known Problems Sister     No Known Problems Sister     No Known Problems Daughter     Breast cancer Maternal Cousin     No Known Problems Maternal Aunt     No Known Problems Paternal Aunt     No Known Problems Paternal Aunt     No Known Problems Paternal Aunt     No Known Problems Paternal Aunt      Social History     Socioeconomic History    Marital status: Single     Spouse name: Not on file    Number of children: Not on file    Years of education: Not on file    Highest education level: Not on file   Occupational History    Not on file   Tobacco Use    Smoking status: Never    Smokeless tobacco: Never   Vaping Use    Vaping status: Never Used    Substance and Sexual Activity    Alcohol use: Not Currently    Drug use: Never    Sexual activity: Not on file   Other Topics Concern    Not on file   Social History Narrative    Not on file     Social Determinants of Health     Financial Resource Strain: Low Risk  (10/7/2024)    Received from [facility]    Overall Financial Resource Strain (CARDIA)     Difficulty of Paying Living Expenses: Not hard at all   Food Insecurity: Patient Declined (10/7/2024)    Received from [facility]    Hunger Vital Sign     Worried About Running Out of Food in the Last Year: Patient declined     Ran Out of Food in the Last Year: Patient declined   Transportation Needs: No Transportation Needs (10/7/2024)    Received from [facility]    PRAPARE - Transportation     Lack of Transportation (Medical): No     Lack of Transportation (Non-Medical): No   Physical Activity: Not on file   Stress: Patient Declined (10/7/2024)    Received from [facility]    [employer] - Occupational Stress Questionnaire     Feeling of Stress : Patient declined   Social Connections: Feeling Socially Integrated (10/7/2024)    Received from [facility]    OASIS : Social Isolation     How often do you feel lonely or isolated from those around you?: Never   Intimate Partner Violence: Not At Risk (10/7/2024)    Received from [facility]    Humiliation, Afraid, Rape, and Kick questionnaire     Fear of Current or Ex-Partner: No     Emotionally Abused: No     Physically Abused: No     Sexually Abused: No   Housing Stability: Low Risk  (10/7/2024)    Received from [facility]    Housing Stability Vital Sign     Unable to Pay for Housing in the Last Year: No     Number of Times Moved in the Last Year: 0     Homeless in the Last Year: No       Current Outpatient Medications:     acetaminophen (TYLENOL) 500 mg tablet,  Take 2 tablets (1,000 mg total) by mouth every 8 (eight) hours, Disp: 40 tablet, Rfl: 0    dextromethorphan-guaifenesin (MUCINEX DM)  MG per 12 hr tablet, Take 1 tablet by mouth every 12 (twelve) hours (Patient not taking: Reported on 10/16/2024), Disp: 30 tablet, Rfl: 0    diazepam (VALIUM) 2 mg tablet, Take 2.5 tablets (5 mg total) by mouth every 8 (eight) hours as needed (Vertigo) for up to 10 days (Patient not taking: Reported on 10/16/2024), Disp: 15 tablet, Rfl: 0    erythromycin (ILOTYCIN) ophthalmic ointment, Place a 1/2 inch ribbon of ointment into the lower eyelid. (Patient not taking: Reported on 10/16/2024), Disp: 3.5 g, Rfl: 0    erythromycin (ILOTYCIN) ophthalmic ointment, Place a 1/2 inch ribbon of ointment into the lower eyelid. (Patient not taking: Reported on 10/16/2024), Disp: 3.5 g, Rfl: 0    fluticasone (FLONASE) 50 mcg/act nasal spray, 1 spray into each nostril daily, Disp: 16 g, Rfl: 0    fluticasone (FLONASE) 50 mcg/act nasal spray, 1 spray into each nostril daily (Patient not taking: Reported on 10/16/2024), Disp: 16 g, Rfl: 0    ibuprofen (MOTRIN) 600 mg tablet, Take 1 tablet (600 mg total) by mouth every 6 (six) hours as needed for mild pain, Disp: 30 tablet, Rfl: 0    labetalol (NORMODYNE) 200 mg tablet, Take 200 mg by mouth every morning, Disp: , Rfl:     levothyroxine 50 mcg tablet, Take 50 mcg by mouth daily, Disp: , Rfl:     lidocaine (Lidoderm) 5 %, Apply 1 patch topically over 12 hours daily Remove & Discard patch within 12 hours or as directed by MD (Patient not taking: Reported on 10/16/2024), Disp: 15 patch, Rfl: 0    meclizine (ANTIVERT) 25 mg tablet, Take 1 tablet (25 mg total) by mouth 3 (three) times a day as needed for dizziness (Patient not taking: Reported on 10/16/2024), Disp: 30 tablet, Rfl: 0    methocarbamol (ROBAXIN) 500 mg tablet, Take 1 tablet (500 mg total) by mouth 2 (two) times a day (Patient not taking: Reported on 10/16/2024), Disp: 20 tablet, Rfl: 0     methocarbamol (ROBAXIN) 500 mg tablet, Take 1 tablet (500 mg total) by mouth 2 (two) times a day (Patient not taking: Reported on 10/16/2024), Disp: 20 tablet, Rfl: 0    naproxen (Naprosyn) 500 mg tablet, Take 1 tablet (500 mg total) by mouth 2 (two) times a day with meals (Patient not taking: Reported on 10/16/2024), Disp: 30 tablet, Rfl: 0    Zepbound 2.5 MG/0.5ML auto-injector, ADMINISTER 2.5 MG UNDER THE SKIN EVERY 7 DAYS, Disp: , Rfl:   Allergies   Allergen Reactions    Hydrocodone-Acetaminophen Itching     There were no vitals filed for this visit.    Physical Exam  Chest:   Breasts:     Right: No swelling, bleeding, inverted nipple, mass, nipple discharge, skin change or tenderness.      Left: No swelling, bleeding, inverted nipple, mass, nipple discharge, skin change or tenderness.               Results:    Imaging  US breast cyst aspiration right initial    Result Date: 10/15/2024  Narrative: DIAGNOSIS: Cellulitis of breast INDICATION: Mindy Vang is a 39 y.o. female presenting for drainage of the right breast abscess. Prior to the procedure, previous imaging was reviewed.  The procedure was explained to the patient in detail.  All questions were answered.  Written and verbal informed consent was obtained. FINDINGS: RIGHT 1) MASS [A]: Images of the right breast mass at 7 o'clock, 15 cm from the nipple were obtained. The patient was placed supine on the biopsy table. A fine needle aspiration (FNA) of a 25 mm x 15 mm x 28 mm complex fluid collection consistent with abscess was performed under ultrasound guidance using a lateral approach. The skin was prepped in the usual fashion. Anesthesia was administered using 5 mL of lidocaine 1%. An 18 G fine needle aspiration (FNA) device was advanced.  Approximately 4 cc of purulent/bloody material was aspirated and sent for culture and sensitivity.     Impression:  Ultrasound-guided drainage of the right breast abscess at 7:00.  Clinical management recommended.  ASSESSMENT/BI-RADS CATEGORY: Right: 2 - Benign Overall: 2 - Benign RECOMMENDATION:      - Ultrasound in 6 weeks for the right breast. Workstation ID: [id number] Signed by:  Rhona Le MD     US breast right limited (diagnostic)    Result Date: 10/14/2024  Narrative: DIAGNOSIS:  Pain and redness right breast at 7:00, evaluate for abscess TECHNIQUE: Ultrasound of the right breast(s) was performed. COMPARISONS: None available. RELEVANT HISTORY: Family Breast Cancer History: No known family history of breast cancer. Family Medical History: No known relevant family medical history. Personal History: No known relevant hormone history. No known relevant surgical history. No known relevant medical history. RISK ASSESSMENT: [facility] risk assessment reporting was suppressed due to the patient's history and/or demographic factors. INDICATION: Mindy Vang is a 39 y.o. female presenting for r/o right breast abscess at 7 o clock position. FINDINGS: There is a complex heterogeneous hypoechoic collection at 7:00 15 cm from the nipple measuring 2.1 x 1.1 x 2.8 cm with associated increase surrounding echogenicity reflecting inflammation and skin thickening.  Findings are consistent with an abscess.  Increased peripheral vascularity is seen.  Right breast implant is noted. Preliminary report was given by Dr. Todd Whitfield on 10/13/2024 to Dr. Ludwin Diallo.     Impression:  Right breast abscess at 7:00.  Follow-up evaluation with ultrasound is recommended in 2 months interval. Clinical management recommended. ASSESSMENT/BI-RADS CATEGORY: Right: 3 - Probably Benign Overall: 3 - Probably Benign RECOMMENDATION:      - Ultrasound in 6 months for the right breast.      - Clinical management for the right breast. Workstation ID: [id number] Signed by:  Summer Roberson MD       I reviewed the above imaging data.      Advance Care Planning/Advance Directives:  Discussed disease status, cancer treatment plans and/or cancer  treatment goals with the patient.

## 2024-12-02 ENCOUNTER — HOSPITAL ENCOUNTER (EMERGENCY)
Facility: HOSPITAL | Age: 39
Discharge: HOME | End: 2024-12-02
Attending: EMERGENCY MEDICINE
Payer: COMMERCIAL

## 2024-12-02 VITALS
HEART RATE: 86 BPM | SYSTOLIC BLOOD PRESSURE: 153 MMHG | DIASTOLIC BLOOD PRESSURE: 95 MMHG | OXYGEN SATURATION: 100 % | TEMPERATURE: 98.4 F | RESPIRATION RATE: 18 BRPM

## 2024-12-02 DIAGNOSIS — R19.7 DIARRHEA: Primary | ICD-10-CM

## 2024-12-02 DIAGNOSIS — E86.0 DEHYDRATION: ICD-10-CM

## 2024-12-02 LAB
ALBUMIN SERPL BCG-MCNC: 4.7 G/DL (ref 3.5–5)
ALP SERPL-CCNC: 42 U/L (ref 34–104)
ALT SERPL W P-5'-P-CCNC: 27 U/L (ref 7–52)
ANION GAP SERPL CALCULATED.3IONS-SCNC: 6 MMOL/L (ref 4–13)
AST SERPL W P-5'-P-CCNC: 14 U/L (ref 13–39)
BASOPHILS # BLD AUTO: 0.04 THOUSANDS/ÂΜL (ref 0–0.1)
BASOPHILS NFR BLD AUTO: 1 % (ref 0–1)
BILIRUB SERPL-MCNC: 0.39 MG/DL (ref 0.2–1)
BUN SERPL-MCNC: 11 MG/DL (ref 5–25)
CALCIUM SERPL-MCNC: 9.5 MG/DL (ref 8.4–10.2)
CHLORIDE SERPL-SCNC: 108 MMOL/L (ref 96–108)
CO2 SERPL-SCNC: 26 MMOL/L (ref 21–32)
CREAT SERPL-MCNC: 0.71 MG/DL (ref 0.6–1.3)
EOSINOPHIL # BLD AUTO: 0.07 THOUSAND/ÂΜL (ref 0–0.61)
EOSINOPHIL NFR BLD AUTO: 1 % (ref 0–6)
ERYTHROCYTE [DISTWIDTH] IN BLOOD BY AUTOMATED COUNT: 12.7 % (ref 11.6–15.1)
GFR SERPL CREATININE-BSD FRML MDRD: 109 ML/MIN/1.73SQ M
GLUCOSE SERPL-MCNC: 98 MG/DL (ref 65–140)
HCT VFR BLD AUTO: 38.9 % (ref 34.8–46.1)
HGB BLD-MCNC: 13.3 G/DL (ref 11.5–15.4)
IMM GRANULOCYTES # BLD AUTO: 0.02 THOUSAND/UL (ref 0–0.2)
IMM GRANULOCYTES NFR BLD AUTO: 0 % (ref 0–2)
LIPASE SERPL-CCNC: 39 U/L (ref 11–82)
LYMPHOCYTES # BLD AUTO: 2.35 THOUSANDS/ÂΜL (ref 0.6–4.47)
LYMPHOCYTES NFR BLD AUTO: 35 % (ref 14–44)
MCH RBC QN AUTO: 29.2 PG (ref 26.8–34.3)
MCHC RBC AUTO-ENTMCNC: 34.2 G/DL (ref 31.4–37.4)
MCV RBC AUTO: 86 FL (ref 82–98)
MONOCYTES # BLD AUTO: 0.52 THOUSAND/ÂΜL (ref 0.17–1.22)
MONOCYTES NFR BLD AUTO: 8 % (ref 4–12)
NEUTROPHILS # BLD AUTO: 3.75 THOUSANDS/ÂΜL (ref 1.85–7.62)
NEUTS SEG NFR BLD AUTO: 55 % (ref 43–75)
NRBC BLD AUTO-RTO: 0 /100 WBCS
PLATELET # BLD AUTO: 276 THOUSANDS/UL (ref 149–390)
PMV BLD AUTO: 10 FL (ref 8.9–12.7)
POTASSIUM SERPL-SCNC: 4 MMOL/L (ref 3.5–5.3)
PROT SERPL-MCNC: 7.2 G/DL (ref 6.4–8.4)
RBC # BLD AUTO: 4.55 MILLION/UL (ref 3.81–5.12)
SODIUM SERPL-SCNC: 140 MMOL/L (ref 135–147)
WBC # BLD AUTO: 6.75 THOUSAND/UL (ref 4.31–10.16)

## 2024-12-02 PROCEDURE — 83690 ASSAY OF LIPASE: CPT | Performed by: EMERGENCY MEDICINE

## 2024-12-02 PROCEDURE — 36415 COLL VENOUS BLD VENIPUNCTURE: CPT

## 2024-12-02 PROCEDURE — 99284 EMERGENCY DEPT VISIT MOD MDM: CPT

## 2024-12-02 PROCEDURE — 99283 EMERGENCY DEPT VISIT LOW MDM: CPT

## 2024-12-02 PROCEDURE — 85025 COMPLETE CBC W/AUTO DIFF WBC: CPT | Performed by: EMERGENCY MEDICINE

## 2024-12-02 PROCEDURE — 80053 COMPREHEN METABOLIC PANEL: CPT | Performed by: EMERGENCY MEDICINE

## 2024-12-02 RX ORDER — MAGNESIUM L-LACTATE 84 MG
84 TABLET, EXTENDED RELEASE ORAL DAILY
Qty: 30 TABLET | Refills: 0 | Status: SHIPPED | OUTPATIENT
Start: 2024-12-02

## 2024-12-02 RX ORDER — LOPERAMIDE HYDROCHLORIDE 2 MG/1
2 CAPSULE ORAL 4 TIMES DAILY PRN
Qty: 20 CAPSULE | Refills: 0 | Status: SHIPPED | OUTPATIENT
Start: 2024-12-02

## 2024-12-02 RX ORDER — POTASSIUM CHLORIDE 1500 MG/1
20 TABLET, EXTENDED RELEASE ORAL 2 TIMES DAILY
Qty: 20 TABLET | Refills: 0 | Status: SHIPPED | OUTPATIENT
Start: 2024-12-02

## 2024-12-02 NOTE — Clinical Note
Mindy Vang was seen and treated in our emergency department on 12/2/2024.    No restrictions            Diagnosis:     Mindy  may return to work on return date, is off the rest of the shift today.    She may return on this date: 12/04/2024         If you have any questions or concerns, please don't hesitate to call.      Manan Smith PA-C    ______________________________           _______________          _______________  Hospital Representative                              Date                                Time

## 2024-12-03 ENCOUNTER — APPOINTMENT (OUTPATIENT)
Dept: LAB | Facility: CLINIC | Age: 39
End: 2024-12-03
Payer: COMMERCIAL

## 2024-12-03 DIAGNOSIS — R19.7 DIARRHEA: ICD-10-CM

## 2024-12-03 PROCEDURE — 87505 NFCT AGENT DETECTION GI: CPT

## 2024-12-04 LAB
C COLI+JEJUNI TUF STL QL NAA+PROBE: NEGATIVE
EC STX1+STX2 GENES STL QL NAA+PROBE: NEGATIVE
SALMONELLA SP SPAO STL QL NAA+PROBE: NEGATIVE
SHIGELLA SP+EIEC IPAH STL QL NAA+PROBE: NEGATIVE

## 2024-12-04 NOTE — ED PROVIDER NOTES
"Time reflects when diagnosis was documented in both MDM as applicable and the Disposition within this note       Time User Action Codes Description Comment    12/2/2024  6:38 PM Manan Smith [R19.7] Diarrhea     12/2/2024  6:38 PM Manan Smith [E86.0] Dehydration           ED Disposition       ED Disposition   Discharge    Condition   Stable    Date/Time   Mon Dec 2, 2024  6:38 PM    Comment   Mindy Vang discharge to home/self care.                   Assessment & Plan       Medical Decision Making  39-year-old female presenting today with concerns of diarrhea, nonbloody, watery, over the last 2 days.  Recently started on Zofran about 2 months ago.  Patient has concerned that her symptoms may be related.  Lab workup was reassuring.  On repeat abdominal exam, patient still without tenderness.  No bowel movements while here in the ER.  Will send for stool study, give loperamide, give electrolyte supplementation per patient request.  Strict turn precautions discussed with patient.  ------------------------------------------------------------  Strict return precautions discussed. Patient at time of discharge well-appearing in no acute distress, all questions answered. Patient agreeable to plan.  Patient's vitals, lab/imaging results, diagnosis, and treatment plan were discussed with the patient. All new/changed medications were discussed with patient, specifically, route of administration, how often and when to take, and where they can be picked up. Strict return precautions as well as close follow up with PCP was discussed with the patient and the patient was agreeable to my recommendations.  Patient verbally acknowledged understanding of the above communications. All labs reviewed and utilized in the medical decision making process (if labs were ordered). Portions of the record may have been created with voice recognition software.  Occasional wrong word or \"sound a like\" substitutions may have " occurred due to the inherent limitations of voice recognition software.  Read the chart carefully and recognize, using context, where substitutions have occurred.      Amount and/or Complexity of Data Reviewed  Labs: ordered.    Risk  OTC drugs.  Prescription drug management.             Medications - No data to display    ED Risk Strat Scores                                               History of Present Illness       Chief Complaint   Patient presents with    Diarrhea     Pt reports diarrhea and gas x2 days, denies abd pain, feeling dehydrated; on zepbound for 2 months       Past Medical History:   Diagnosis Date    Disease of thyroid gland     Hypertension       Past Surgical History:   Procedure Laterality Date    AUGMENTATION MAMMAPLASTY Bilateral 05/2014    COSMETIC SURGERY      US BREAST CYST ASPIRATION RIGHT INITIAL Right 10/15/2024      Family History   Problem Relation Age of Onset    No Known Problems Mother     No Known Problems Father     No Known Problems Sister     No Known Problems Sister     No Known Problems Daughter     Breast cancer Maternal Cousin     No Known Problems Maternal Aunt     No Known Problems Paternal Aunt     No Known Problems Paternal Aunt     No Known Problems Paternal Aunt     No Known Problems Paternal Aunt       Social History     Tobacco Use    Smoking status: Never    Smokeless tobacco: Never   Vaping Use    Vaping status: Never Used   Substance Use Topics    Alcohol use: Not Currently    Drug use: Never      E-Cigarette/Vaping    E-Cigarette Use Never User       E-Cigarette/Vaping Substances      I have reviewed and agree with the history as documented.     39-year-old female presents today with concerns of watery diarrhea for the last 2 days.  Recently started Zepbound.  Denies any nausea or vomiting.  Denies any abdominal cramping or pain.  No urinary symptoms.  No vaginal symptoms.  No chest pain or shortness of breath.  No fever or chills.  No blood in the diarrhea.   No other symptoms.        Review of Systems   Constitutional:  Negative for chills and fever.   HENT:  Negative for ear pain and sore throat.    Eyes:  Negative for pain and visual disturbance.   Respiratory:  Negative for cough and shortness of breath.    Cardiovascular:  Negative for chest pain and palpitations.   Gastrointestinal:  Positive for diarrhea. Negative for abdominal pain, blood in stool, constipation, nausea and vomiting.   Genitourinary:  Negative for dysuria and hematuria.   Musculoskeletal:  Negative for arthralgias and back pain.   Skin:  Negative for color change and rash.   Neurological:  Negative for seizures and syncope.   All other systems reviewed and are negative.          Objective       ED Triage Vitals   Temperature Pulse Blood Pressure Respirations SpO2 Patient Position - Orthostatic VS   12/02/24 1740 12/02/24 1737 12/02/24 1737 12/02/24 1737 12/02/24 1737 12/02/24 1737   98.4 °F (36.9 °C) 86 153/95 18 100 % Sitting      Temp Source Heart Rate Source BP Location FiO2 (%) Pain Score    12/02/24 1737 12/02/24 1737 12/02/24 1737 -- --    Oral Monitor Right arm        Vitals      Date and Time Temp Pulse SpO2 Resp BP Pain Score FACES Pain Rating User   12/02/24 1740 98.4 °F (36.9 °C) -- -- -- -- -- -- DW   12/02/24 1737 -- 86 100 % 18 153/95 -- -- DW            Physical Exam  Vitals and nursing note reviewed.   Constitutional:       General: She is not in acute distress.     Appearance: She is well-developed. She is not ill-appearing.   HENT:      Head: Normocephalic and atraumatic.   Eyes:      Conjunctiva/sclera: Conjunctivae normal.   Cardiovascular:      Rate and Rhythm: Normal rate and regular rhythm.      Heart sounds: No murmur heard.  Pulmonary:      Effort: Pulmonary effort is normal. No respiratory distress.      Breath sounds: Normal breath sounds.   Abdominal:      Palpations: Abdomen is soft.      Tenderness: There is no abdominal tenderness. There is no right CVA tenderness,  left CVA tenderness or guarding.   Musculoskeletal:         General: No swelling.      Cervical back: Neck supple.   Skin:     General: Skin is warm and dry.      Capillary Refill: Capillary refill takes less than 2 seconds.   Neurological:      Mental Status: She is alert.   Psychiatric:         Mood and Affect: Mood normal.         Results Reviewed       Procedure Component Value Units Date/Time    Comprehensive metabolic panel [[id number]] Collected: 12/02/24 1740    Lab Status: Final result Specimen: Blood from Arm, Left Updated: 12/02/24 1804     Sodium 140 mmol/L      Potassium 4.0 mmol/L      Chloride 108 mmol/L      CO2 26 mmol/L      ANION GAP 6 mmol/L      BUN 11 mg/dL      Creatinine 0.71 mg/dL      Glucose 98 mg/dL      Calcium 9.5 mg/dL      AST 14 U/L      ALT 27 U/L      Alkaline Phosphatase 42 U/L      Total Protein 7.2 g/dL      Albumin 4.7 g/dL      Total Bilirubin 0.39 mg/dL      eGFR 109 ml/min/1.73sq m     Narrative:      [employer] guidelines for Chronic Kidney Disease (CKD):     Stage 1 with normal or high GFR (GFR > 90 mL/min/1.73 square meters)    Stage 2 Mild CKD (GFR = 60-89 mL/min/1.73 square meters)    Stage 3A Moderate CKD (GFR = 45-59 mL/min/1.73 square meters)    Stage 3B Moderate CKD (GFR = 30-44 mL/min/1.73 square meters)    Stage 4 Severe CKD (GFR = 15-29 mL/min/1.73 square meters)    Stage 5 End Stage CKD (GFR <15 mL/min/1.73 square meters)  Note: GFR calculation is accurate only with a steady state creatinine    Lipase [[id number]]  (Normal) Collected: 12/02/24 1740    Lab Status: Final result Specimen: Blood from Arm, Left Updated: 12/02/24 1804     Lipase 39 u/L     CBC and differential [[id number]] Collected: 12/02/24 1740    Lab Status: Final result Specimen: Blood from Arm, Left Updated: 12/02/24 1745     WBC 6.75 Thousand/uL      RBC 4.55 Million/uL      Hemoglobin 13.3 g/dL      Hematocrit 38.9 %      MCV 86 fL      MCH 29.2 pg      MCHC 34.2 g/dL       RDW 12.7 %      MPV 10.0 fL      Platelets 276 Thousands/uL      nRBC 0 /100 WBCs      Segmented % 55 %      Immature Grans % 0 %      Lymphocytes % 35 %      Monocytes % 8 %      Eosinophils Relative 1 %      Basophils Relative 1 %      Absolute Neutrophils 3.75 Thousands/µL      Absolute Immature Grans 0.02 Thousand/uL      Absolute Lymphocytes 2.35 Thousands/µL      Absolute Monocytes 0.52 Thousand/µL      Eosinophils Absolute 0.07 Thousand/µL      Basophils Absolute 0.04 Thousands/µL             No orders to display       Procedures    ED Medication and Procedure Management   Prior to Admission Medications   Prescriptions Last Dose Informant Patient Reported? Taking?   Zepbound 2.5 MG/0.5ML auto-injector  Self Yes No   Sig: ADMINISTER 2.5 MG UNDER THE SKIN EVERY 7 DAYS   acetaminophen (TYLENOL) 500 mg tablet  Self No No   Sig: Take 2 tablets (1,000 mg total) by mouth every 8 (eight) hours   dextromethorphan-guaifenesin (MUCINEX DM)  MG per 12 hr tablet  Self No No   Sig: Take 1 tablet by mouth every 12 (twelve) hours   diazepam (VALIUM) 2 mg tablet   No No   Sig: Take 2.5 tablets (5 mg total) by mouth every 8 (eight) hours as needed (Vertigo) for up to 10 days   erythromycin (ILOTYCIN) ophthalmic ointment  Self No No   Sig: Place a 1/2 inch ribbon of ointment into the lower eyelid.   erythromycin (ILOTYCIN) ophthalmic ointment  Self No No   Sig: Place a 1/2 inch ribbon of ointment into the lower eyelid.   fluticasone (FLONASE) 50 mcg/act nasal spray  Self No No   Si spray into each nostril daily   fluticasone (FLONASE) 50 mcg/act nasal spray  Self No No   Si spray into each nostril daily   ibuprofen (MOTRIN) 600 mg tablet  Self No No   Sig: Take 1 tablet (600 mg total) by mouth every 6 (six) hours as needed for mild pain   labetalol (NORMODYNE) 200 mg tablet  Self Yes No   Sig: Take 200 mg by mouth every morning   levothyroxine 50 mcg tablet  Self Yes No   Sig: Take 50 mcg by mouth daily    lidocaine (Lidoderm) 5 %  Self No No   Sig: Apply 1 patch topically over 12 hours daily Remove & Discard patch within 12 hours or as directed by MD rogerne (ANTIVERT) 25 mg tablet  Self No No   Sig: Take 1 tablet (25 mg total) by mouth 3 (three) times a day as needed for dizziness   medroxyPROGESTERone (PROVERA) 10 mg tablet   Yes No   Sig: Take 10 mg by mouth daily   methocarbamol (ROBAXIN) 500 mg tablet  Self No No   Sig: Take 1 tablet (500 mg total) by mouth 2 (two) times a day   methocarbamol (ROBAXIN) 500 mg tablet  Self No No   Sig: Take 1 tablet (500 mg total) by mouth 2 (two) times a day   naproxen (Naprosyn) 500 mg tablet  Self No No   Sig: Take 1 tablet (500 mg total) by mouth 2 (two) times a day with meals   Patient not taking: Reported on 10/16/2024      Facility-Administered Medications: None     Discharge Medication List as of 12/2/2024  6:39 PM        START taking these medications    Details   loperamide (IMODIUM) 2 mg capsule Take 1 capsule (2 mg total) by mouth 4 (four) times a day as needed for diarrhea, Starting Mon 12/2/2024, Normal      magnesium (MAGTAB) 84 MG (7MEQ) TBCR Take 1 tablet (84 mg total) by mouth daily, Starting Mon 12/2/2024, Normal      potassium chloride (Klor-Con M20) 20 mEq tablet Take 1 tablet (20 mEq total) by mouth 2 (two) times a day, Starting Mon 12/2/2024, Normal           CONTINUE these medications which have NOT CHANGED    Details   acetaminophen (TYLENOL) 500 mg tablet Take 2 tablets (1,000 mg total) by mouth every 8 (eight) hours, Starting Tue 1/16/2024, Normal      dextromethorphan-guaifenesin (MUCINEX DM)  MG per 12 hr tablet Take 1 tablet by mouth every 12 (twelve) hours, Starting Tue 11/1/2022, Normal      diazepam (VALIUM) 2 mg tablet Take 2.5 tablets (5 mg total) by mouth every 8 (eight) hours as needed (Vertigo) for up to 10 days, Starting Tue 10/17/2023, Until Fri 10/27/2023 at 2359, Normal      !! erythromycin (ILOTYCIN) ophthalmic ointment Place  a 1/2 inch ribbon of ointment into the lower eyelid., Normal      !! erythromycin (ILOTYCIN) ophthalmic ointment Place a 1/2 inch ribbon of ointment into the lower eyelid., Normal      !! fluticasone (FLONASE) 50 mcg/act nasal spray 1 spray into each nostril daily, Starting Tue 11/1/2022, Normal      !! fluticasone (FLONASE) 50 mcg/act nasal spray 1 spray into each nostril daily, Starting Tue 10/17/2023, Normal      ibuprofen (MOTRIN) 600 mg tablet Take 1 tablet (600 mg total) by mouth every 6 (six) hours as needed for mild pain, Starting Tue 1/16/2024, Normal      labetalol (NORMODYNE) 200 mg tablet Take 200 mg by mouth every morning, Historical Med      levothyroxine 50 mcg tablet Take 50 mcg by mouth daily, Historical Med      lidocaine (Lidoderm) 5 % Apply 1 patch topically over 12 hours daily Remove & Discard patch within 12 hours or as directed by MD, Starting Tue 1/16/2024, Normal      meclizine (ANTIVERT) 25 mg tablet Take 1 tablet (25 mg total) by mouth 3 (three) times a day as needed for dizziness, Starting Tue 3/28/2023, Normal      medroxyPROGESTERone (PROVERA) 10 mg tablet Take 10 mg by mouth daily, Starting Mon 9/30/2024, Historical Med      !! methocarbamol (ROBAXIN) 500 mg tablet Take 1 tablet (500 mg total) by mouth 2 (two) times a day, Starting Wed 6/7/2023, Normal      !! methocarbamol (ROBAXIN) 500 mg tablet Take 1 tablet (500 mg total) by mouth 2 (two) times a day, Starting Tue 1/16/2024, Normal      naproxen (Naprosyn) 500 mg tablet Take 1 tablet (500 mg total) by mouth 2 (two) times a day with meals, Starting Tue 11/1/2022, Normal      Zepbound 2.5 MG/0.5ML auto-injector ADMINISTER 2.5 MG UNDER THE SKIN EVERY 7 DAYS, Historical Med       !! - Potential duplicate medications found. Please discuss with provider.        Outpatient Discharge Orders   Stool Enteric Bacterial Panel by PCR   Standing Status: Future Number of Occurrences: 1 Standing Exp. Date: 12/02/25     ED SEPSIS DOCUMENTATION    Time reflects when diagnosis was documented in both MDM as applicable and the Disposition within this note       Time User Action Codes Description Comment    12/2/2024  6:38 PM Manan Smith [R19.7] Diarrhea     12/2/2024  6:38 PM Manan Smith [E86.0] Dehydration                  Manan Smith PA-C  12/04/24 1149

## 2025-07-30 ENCOUNTER — APPOINTMENT (EMERGENCY)
Dept: RADIOLOGY | Facility: HOSPITAL | Age: 40
End: 2025-07-30
Payer: COMMERCIAL

## 2025-07-30 ENCOUNTER — HOSPITAL ENCOUNTER (EMERGENCY)
Facility: HOSPITAL | Age: 40
Discharge: HOME | End: 2025-07-30
Attending: EMERGENCY MEDICINE | Admitting: EMERGENCY MEDICINE
Payer: COMMERCIAL

## 2025-07-30 VITALS
TEMPERATURE: 99 F | DIASTOLIC BLOOD PRESSURE: 85 MMHG | HEART RATE: 83 BPM | RESPIRATION RATE: 18 BRPM | OXYGEN SATURATION: 100 % | SYSTOLIC BLOOD PRESSURE: 136 MMHG

## 2025-07-30 DIAGNOSIS — R10.13 EPIGASTRIC PAIN: Primary | ICD-10-CM

## 2025-07-30 LAB
ALBUMIN SERPL BCG-MCNC: 4.7 G/DL (ref 3.5–5)
ALP SERPL-CCNC: 37 U/L (ref 34–104)
ALT SERPL W P-5'-P-CCNC: 17 U/L (ref 7–52)
ANION GAP SERPL CALCULATED.3IONS-SCNC: 7 MMOL/L (ref 4–13)
AST SERPL W P-5'-P-CCNC: 12 U/L (ref 13–39)
ATRIAL RATE: 87 BPM
BASOPHILS # BLD AUTO: 0.02 THOUSANDS/ÂΜL (ref 0–0.1)
BASOPHILS NFR BLD AUTO: 0 % (ref 0–1)
BILIRUB SERPL-MCNC: 0.47 MG/DL (ref 0.2–1)
BUN SERPL-MCNC: 10 MG/DL (ref 5–25)
CALCIUM SERPL-MCNC: 9.3 MG/DL (ref 8.4–10.2)
CARDIAC TROPONIN I PNL SERPL HS: <2 NG/L (ref ?–50)
CHLORIDE SERPL-SCNC: 108 MMOL/L (ref 96–108)
CO2 SERPL-SCNC: 23 MMOL/L (ref 21–32)
CREAT SERPL-MCNC: 0.66 MG/DL (ref 0.6–1.3)
EOSINOPHIL # BLD AUTO: 0.06 THOUSAND/ÂΜL (ref 0–0.61)
EOSINOPHIL NFR BLD AUTO: 1 % (ref 0–6)
ERYTHROCYTE [DISTWIDTH] IN BLOOD BY AUTOMATED COUNT: 12.5 % (ref 11.6–15.1)
EXT PREGNANCY TEST URINE: NEGATIVE
EXT. CONTROL: NORMAL
GFR SERPL CREATININE-BSD FRML MDRD: 112 ML/MIN/1.73SQ M
GLUCOSE SERPL-MCNC: 90 MG/DL (ref 65–140)
HCT VFR BLD AUTO: 37.2 % (ref 34.8–46.1)
HGB BLD-MCNC: 12.7 G/DL (ref 11.5–15.4)
IMM GRANULOCYTES # BLD AUTO: 0.02 THOUSAND/UL (ref 0–0.2)
IMM GRANULOCYTES NFR BLD AUTO: 0 % (ref 0–2)
LIPASE SERPL-CCNC: 26 U/L (ref 11–82)
LYMPHOCYTES # BLD AUTO: 2.26 THOUSANDS/ÂΜL (ref 0.6–4.47)
LYMPHOCYTES NFR BLD AUTO: 31 % (ref 14–44)
MCH RBC QN AUTO: 29.7 PG (ref 26.8–34.3)
MCHC RBC AUTO-ENTMCNC: 34.1 G/DL (ref 31.4–37.4)
MCV RBC AUTO: 87 FL (ref 82–98)
MONOCYTES # BLD AUTO: 0.63 THOUSAND/ÂΜL (ref 0.17–1.22)
MONOCYTES NFR BLD AUTO: 9 % (ref 4–12)
NEUTROPHILS # BLD AUTO: 4.29 THOUSANDS/ÂΜL (ref 1.85–7.62)
NEUTS SEG NFR BLD AUTO: 59 % (ref 43–75)
NRBC BLD AUTO-RTO: 0 /100 WBCS
P AXIS: 48 DEGREES
PLATELET # BLD AUTO: 219 THOUSANDS/UL (ref 149–390)
PMV BLD AUTO: 10.1 FL (ref 8.9–12.7)
POTASSIUM SERPL-SCNC: 3.4 MMOL/L (ref 3.5–5.3)
PR INTERVAL: 180 MS
PROT SERPL-MCNC: 7 G/DL (ref 6.4–8.4)
QRS AXIS: 78 DEGREES
QRSD INTERVAL: 78 MS
QT INTERVAL: 378 MS
QTC INTERVAL: 454 MS
RBC # BLD AUTO: 4.28 MILLION/UL (ref 3.81–5.12)
SODIUM SERPL-SCNC: 138 MMOL/L (ref 135–147)
T WAVE AXIS: 52 DEGREES
VENTRICULAR RATE: 87 BPM
WBC # BLD AUTO: 7.28 THOUSAND/UL (ref 4.31–10.16)

## 2025-07-30 PROCEDURE — 83690 ASSAY OF LIPASE: CPT

## 2025-07-30 PROCEDURE — 93010 ELECTROCARDIOGRAM REPORT: CPT | Performed by: STUDENT IN AN ORGANIZED HEALTH CARE EDUCATION/TRAINING PROGRAM

## 2025-07-30 PROCEDURE — 84484 ASSAY OF TROPONIN QUANT: CPT

## 2025-07-30 PROCEDURE — 99285 EMERGENCY DEPT VISIT HI MDM: CPT | Performed by: EMERGENCY MEDICINE

## 2025-07-30 PROCEDURE — 96374 THER/PROPH/DIAG INJ IV PUSH: CPT

## 2025-07-30 PROCEDURE — 85025 COMPLETE CBC W/AUTO DIFF WBC: CPT

## 2025-07-30 PROCEDURE — 81025 URINE PREGNANCY TEST: CPT

## 2025-07-30 PROCEDURE — 99285 EMERGENCY DEPT VISIT HI MDM: CPT

## 2025-07-30 PROCEDURE — 36415 COLL VENOUS BLD VENIPUNCTURE: CPT

## 2025-07-30 PROCEDURE — 71046 X-RAY EXAM CHEST 2 VIEWS: CPT

## 2025-07-30 PROCEDURE — 80053 COMPREHEN METABOLIC PANEL: CPT

## 2025-07-30 PROCEDURE — 93005 ELECTROCARDIOGRAM TRACING: CPT

## 2025-07-30 RX ORDER — FAMOTIDINE 10 MG/ML
20 INJECTION, SOLUTION INTRAVENOUS ONCE
Status: COMPLETED | OUTPATIENT
Start: 2025-07-30 | End: 2025-07-30

## 2025-07-30 RX ORDER — MAGNESIUM HYDROXIDE/ALUMINUM HYDROXICE/SIMETHICONE 120; 1200; 1200 MG/30ML; MG/30ML; MG/30ML
30 SUSPENSION ORAL ONCE
Status: COMPLETED | OUTPATIENT
Start: 2025-07-30 | End: 2025-07-30

## 2025-07-30 RX ORDER — POTASSIUM CHLORIDE 1500 MG/1
40 TABLET, EXTENDED RELEASE ORAL ONCE
Status: COMPLETED | OUTPATIENT
Start: 2025-07-30 | End: 2025-07-30

## 2025-07-30 RX ORDER — FAMOTIDINE 20 MG/1
20 TABLET, FILM COATED ORAL 2 TIMES DAILY
Qty: 30 TABLET | Refills: 0 | Status: SHIPPED | OUTPATIENT
Start: 2025-07-30 | End: 2025-07-30 | Stop reason: CLARIF

## 2025-07-30 RX ORDER — OMEPRAZOLE 20 MG/1
20 CAPSULE, DELAYED RELEASE ORAL DAILY
Qty: 14 CAPSULE | Refills: 0 | Status: SHIPPED | OUTPATIENT
Start: 2025-07-30

## 2025-07-30 RX ADMIN — FAMOTIDINE 20 MG: 10 INJECTION, SOLUTION INTRAVENOUS at 01:59

## 2025-07-30 RX ADMIN — ALUMINUM HYDROXIDE, MAGNESIUM HYDROXIDE, AND SIMETHICONE 30 ML: 200; 200; 20 SUSPENSION ORAL at 01:59

## 2025-07-30 RX ADMIN — POTASSIUM CHLORIDE 40 MEQ: 1500 TABLET, EXTENDED RELEASE ORAL at 03:31
